# Patient Record
Sex: FEMALE | Race: ASIAN | NOT HISPANIC OR LATINO | Employment: OTHER | URBAN - METROPOLITAN AREA
[De-identification: names, ages, dates, MRNs, and addresses within clinical notes are randomized per-mention and may not be internally consistent; named-entity substitution may affect disease eponyms.]

---

## 2018-07-18 ENCOUNTER — OFFICE VISIT (OUTPATIENT)
Dept: ENDOCRINOLOGY | Facility: HOSPITAL | Age: 68
End: 2018-07-18
Payer: COMMERCIAL

## 2018-07-18 VITALS
SYSTOLIC BLOOD PRESSURE: 104 MMHG | BODY MASS INDEX: 27.32 KG/M2 | WEIGHT: 164 LBS | DIASTOLIC BLOOD PRESSURE: 62 MMHG | HEART RATE: 74 BPM | HEIGHT: 65 IN

## 2018-07-18 DIAGNOSIS — E03.8 HYPOTHYROIDISM DUE TO HASHIMOTO'S THYROIDITIS: Primary | ICD-10-CM

## 2018-07-18 DIAGNOSIS — E78.5 HYPERLIPIDEMIA, UNSPECIFIED HYPERLIPIDEMIA TYPE: ICD-10-CM

## 2018-07-18 DIAGNOSIS — Z83.3 FAMILY HISTORY OF DIABETES MELLITUS: ICD-10-CM

## 2018-07-18 DIAGNOSIS — E06.3 HYPOTHYROIDISM DUE TO HASHIMOTO'S THYROIDITIS: Primary | ICD-10-CM

## 2018-07-18 PROBLEM — E03.9 HYPOTHYROID: Status: ACTIVE | Noted: 2018-07-18

## 2018-07-18 PROCEDURE — 99203 OFFICE O/P NEW LOW 30 MIN: CPT | Performed by: INTERNAL MEDICINE

## 2018-07-18 RX ORDER — LEVOTHYROXINE SODIUM 112 MCG
112 TABLET ORAL DAILY
Qty: 90 TABLET | Refills: 3 | Status: SHIPPED | OUTPATIENT
Start: 2018-07-18 | End: 2018-08-15 | Stop reason: SDUPTHER

## 2018-07-18 RX ORDER — TAMOXIFEN CITRATE 20 MG/1
20 TABLET ORAL DAILY
COMMUNITY
Start: 2018-04-29

## 2018-07-18 RX ORDER — LEVOTHYROXINE SODIUM 112 UG/1
112 TABLET ORAL DAILY
COMMUNITY
End: 2018-07-18 | Stop reason: SDUPTHER

## 2018-07-18 RX ORDER — SIMVASTATIN 10 MG
10 TABLET ORAL
COMMUNITY
Start: 2018-05-28 | End: 2018-08-27 | Stop reason: SDUPTHER

## 2018-07-18 RX ORDER — ACETAMINOPHEN 160 MG
4000 TABLET,DISINTEGRATING ORAL DAILY
COMMUNITY

## 2018-07-18 NOTE — LETTER
July 18, 2018     Pipo Cadet DO  P O  [facility]    Patient: Mary Driscoll   YOB: 1950   Date of Visit: 7/18/2018       Dear Dr Aisha Diamond: Thank you for referring Mary Driscoll to me for evaluation  Below are my notes for this consultation  If you have questions, please do not hesitate to call me  I look forward to following your patient along with you  Sincerely,        Carlos Yu MD        CC: No Recipients  Carlos Yu MD  7/18/2018 10:06 AM  Sign at close encounter  7/18/2018    Assessment/Plan      Diagnoses and all orders for this visit:    Hypothyroidism due to Hashimoto's thyroiditis  -     T4, free Lab Collect  -     Thyroid Antibodies Panel Lab Collect  -     TSH, 3rd generation Lab Collect  -     SYNTHROID 112 MCG tablet; Take 1 tablet (112 mcg total) by mouth daily    Hyperlipidemia, unspecified hyperlipidemia type  -     Comprehensive metabolic panel Lab Collect  -     Lipid Panel with Direct LDL reflex Lab Collect    Family history of diabetes mellitus  -     Comprehensive metabolic panel Lab Collect  -     CBC and differential Lab Collect  -     HEMOGLOBIN A1C W/ EAG ESTIMATION Lab Collect  -     Lipid Panel with Direct LDL reflex Lab Collect    Other orders  -     tamoxifen (NOLVADEX) 20 mg tablet; Take 20 mg by mouth daily    -     simvastatin (ZOCOR) 10 mg tablet; Take 10 mg by mouth daily at bedtime    -     Discontinue: levothyroxine (SYNTHROID) 112 mcg tablet; Take 112 mcg by mouth daily  -     cholecalciferol (VITAMIN D3) 1,000 units tablet; Take 1,000 Units by mouth daily  -     psyllium (METAMUCIL) 58 6 % powder; Take 1 packet by mouth daily        Assessment/Plan:  1  Hypothyroidism due to Hashimoto's thyroiditis  I have no recent blood work, I have asked her to get a TSH, free T4, thyroid antibody panel done now  For now, she will continue the same brand name Synthroid 112 mcg daily  2   Hyperlipidemia    When I do her blood work for her thyroid, I will order lipid panel with CMP fasting  3   Family history of diabetes  I will be doing a fasting CMP and hemoglobin A1c for screening  I have asked her to get a hemoglobin A1c, CBC, CMP, lipid profile, TSH, free T4, and thyroid antibody panel now  She will call about a week afterwards for the results  If all is well, then I would see her in a year with preceding blood work  CC: thyroid consult    History of Present Illness     HPI: Teri Watson is a 80y o  year old female with history of hypohtyroidism due to hashimoto's thyroiditis  She was diagnosed between 5-10 years ago  She has been on thyroid hormone, brand Synthroid, ever since  She was seeing Dr Saeid Ivan until he retired  She denies heat or cold intolerance but will get some hot flashes  She denies diarrhea, palpitation, tremors, anxiety, depression, fatigue, or weight changes  She will have some sleeping irregularities in that she will wake up around 2 in the morning and have some difficulty getting back to sleep and will have to read for short while  She tends to be on the constipated side  She denies dry skin, brittle nails, or hair loss  She has no diplopia  She has no difficulties with swallowing or compressive thyroid symptoms  She is currently taking Synthroid brand 112 mcg daily  Had a history of osteopenia  Was getting DEXA scans every 2 years  Last dexa scan was normal  She was on Fosamax in the past for about 1-2 years  Review of Systems   Constitutional: Negative for fatigue and unexpected weight change  Lost 2 lbs recently on a diet  HENT: Negative for hearing loss, tinnitus and trouble swallowing  Eyes: Negative for visual disturbance  No diplopia  Wears glasses  Respiratory: Negative for chest tightness and shortness of breath  Cardiovascular: Negative for chest pain, palpitations and leg swelling  Gastrointestinal: Positive for constipation   Negative for abdominal pain, diarrhea and nausea  Occasional constipation  Some hemorrhoids  Endocrine: Negative for cold intolerance and heat intolerance  Still some hot flashes  Genitourinary:        Still on tamoxifen for up to 10 years  Musculoskeletal: Positive for back pain  Negative for arthralgias  Low back pain, chiropractor treatments  Skin: Negative for rash  No dry skin or hair loss  , but has brittle nails  Neurological: Negative for dizziness, tremors, light-headedness, numbness and headaches  Psychiatric/Behavioral: Positive for sleep disturbance  Negative for dysphoric mood  The patient is not nervous/anxious  Can have wake up at 2 am and difficulty to get back to sleep  Historical Information   Past Medical History:   Diagnosis Date    Hyperlipidemia      No past surgical history on file  Social History   History   Alcohol use Not on file     History   Drug use: Unknown     History   Smoking Status    Never Smoker   Smokeless Tobacco    Never Used     Family History:   Family History   Problem Relation Age of Onset    Diabetes unspecified Mother     Cervical cancer Mother     Depression Father        Meds/Allergies   Current Outpatient Prescriptions   Medication Sig Dispense Refill    cholecalciferol (VITAMIN D3) 1,000 units tablet Take 1,000 Units by mouth daily      levothyroxine (SYNTHROID) 112 mcg tablet Take 112 mcg by mouth daily      simvastatin (ZOCOR) 10 mg tablet       tamoxifen (NOLVADEX) 20 mg tablet        No current facility-administered medications for this visit  No Known Allergies    Objective   Vitals: Blood pressure 104/62, pulse 74, height 5' 4 5" (1 638 m), weight 74 4 kg (164 lb)  Invasive Devices          No matching active lines, drains, or airways          Physical Exam   Constitutional: She is oriented to person, place, and time  She appears well-developed and well-nourished  HENT:   Head: Normocephalic and atraumatic     Eyes: Conjunctivae and EOM are normal  Pupils are equal, round, and reactive to light  No lid lag, stare, proptosis, or periorbital edema  Neck: Normal range of motion  Neck supple  No thyromegaly present  No carotid bruits  No palpable thyroid nodules  Cardiovascular: Normal rate, regular rhythm, normal heart sounds and intact distal pulses  No murmur heard  Pulmonary/Chest: Effort normal and breath sounds normal  She has no wheezes  Abdominal: Soft  Bowel sounds are normal  There is no tenderness  Musculoskeletal: Normal range of motion  She exhibits no edema or deformity  No tremor of the outstretched hands  No spinous process tenderness  No CVAT  Lymphadenopathy:     She has no cervical adenopathy  Neurological: She is alert and oriented to person, place, and time  She has normal reflexes  Skin: Skin is warm and dry  No rash noted  Vitals reviewed  The history was obtained from the review of the chart and from the patient  Lab Results:    I have no recent bloodwork  No results found for this or any previous visit (from the past 13698 hour(s))  No future appointments

## 2018-07-18 NOTE — PROGRESS NOTES
7/18/2018    Assessment/Plan      Diagnoses and all orders for this visit:    Hypothyroidism due to Hashimoto's thyroiditis  -     T4, free Lab Collect  -     Thyroid Antibodies Panel Lab Collect  -     TSH, 3rd generation Lab Collect  -     SYNTHROID 112 MCG tablet; Take 1 tablet (112 mcg total) by mouth daily    Hyperlipidemia, unspecified hyperlipidemia type  -     Comprehensive metabolic panel Lab Collect  -     Lipid Panel with Direct LDL reflex Lab Collect    Family history of diabetes mellitus  -     Comprehensive metabolic panel Lab Collect  -     CBC and differential Lab Collect  -     HEMOGLOBIN A1C W/ EAG ESTIMATION Lab Collect  -     Lipid Panel with Direct LDL reflex Lab Collect    Other orders  -     tamoxifen (NOLVADEX) 20 mg tablet; Take 20 mg by mouth daily    -     simvastatin (ZOCOR) 10 mg tablet; Take 10 mg by mouth daily at bedtime    -     Discontinue: levothyroxine (SYNTHROID) 112 mcg tablet; Take 112 mcg by mouth daily  -     cholecalciferol (VITAMIN D3) 1,000 units tablet; Take 1,000 Units by mouth daily  -     psyllium (METAMUCIL) 58 6 % powder; Take 1 packet by mouth daily        Assessment/Plan:  1  Hypothyroidism due to Hashimoto's thyroiditis  I have no recent blood work, I have asked her to get a TSH, free T4, thyroid antibody panel done now  For now, she will continue the same brand name Synthroid 112 mcg daily  2   Hyperlipidemia  When I do her blood work for her thyroid, I will order lipid panel with CMP fasting  3   Family history of diabetes  I will be doing a fasting CMP and hemoglobin A1c for screening  I have asked her to get a hemoglobin A1c, CBC, CMP, lipid profile, TSH, free T4, and thyroid antibody panel now  She will call about a week afterwards for the results  If all is well, then I would see her in a year with preceding blood work        CC: thyroid consult    History of Present Illness     HPI: Teri Watson is a 80y o  year old female with history of hypohtyroidism due to hashimoto's thyroiditis  She was diagnosed between 5-10 years ago  She has been on thyroid hormone, brand Synthroid, ever since  She was seeing Dr Ashley Watkins until he retired  She denies heat or cold intolerance but will get some hot flashes  She denies diarrhea, palpitation, tremors, anxiety, depression, fatigue, or weight changes  She will have some sleeping irregularities in that she will wake up around 2 in the morning and have some difficulty getting back to sleep and will have to read for short while  She tends to be on the constipated side  She denies dry skin, brittle nails, or hair loss  She has no diplopia  She has no difficulties with swallowing or compressive thyroid symptoms  She is currently taking Synthroid brand 112 mcg daily  Had a history of osteopenia  Was getting DEXA scans every 2 years  Last dexa scan was normal  She was on Fosamax in the past for about 1-2 years  Review of Systems   Constitutional: Negative for fatigue and unexpected weight change  Lost 2 lbs recently on a diet  HENT: Negative for hearing loss, tinnitus and trouble swallowing  Eyes: Negative for visual disturbance  No diplopia  Wears glasses  Respiratory: Negative for chest tightness and shortness of breath  Cardiovascular: Negative for chest pain, palpitations and leg swelling  Gastrointestinal: Positive for constipation  Negative for abdominal pain, diarrhea and nausea  Occasional constipation  Some hemorrhoids  Endocrine: Negative for cold intolerance and heat intolerance  Still some hot flashes  Genitourinary:        Still on tamoxifen for up to 10 years  Musculoskeletal: Positive for back pain  Negative for arthralgias  Low back pain, chiropractor treatments  Skin: Negative for rash  No dry skin or hair loss  , but has brittle nails  Neurological: Negative for dizziness, tremors, light-headedness, numbness and headaches  Psychiatric/Behavioral: Positive for sleep disturbance  Negative for dysphoric mood  The patient is not nervous/anxious  Can have wake up at 2 am and difficulty to get back to sleep  Historical Information   Past Medical History:   Diagnosis Date    Hyperlipidemia      No past surgical history on file  Social History   History   Alcohol use Not on file     History   Drug use: Unknown     History   Smoking Status    Never Smoker   Smokeless Tobacco    Never Used     Family History:   Family History   Problem Relation Age of Onset    Diabetes unspecified Mother     Cervical cancer Mother     Depression Father        Meds/Allergies   Current Outpatient Prescriptions   Medication Sig Dispense Refill    cholecalciferol (VITAMIN D3) 1,000 units tablet Take 1,000 Units by mouth daily      levothyroxine (SYNTHROID) 112 mcg tablet Take 112 mcg by mouth daily      simvastatin (ZOCOR) 10 mg tablet       tamoxifen (NOLVADEX) 20 mg tablet        No current facility-administered medications for this visit  No Known Allergies    Objective   Vitals: Blood pressure 104/62, pulse 74, height 5' 4 5" (1 638 m), weight 74 4 kg (164 lb)  Invasive Devices          No matching active lines, drains, or airways          Physical Exam   Constitutional: She is oriented to person, place, and time  She appears well-developed and well-nourished  HENT:   Head: Normocephalic and atraumatic  Eyes: Conjunctivae and EOM are normal  Pupils are equal, round, and reactive to light  No lid lag, stare, proptosis, or periorbital edema  Neck: Normal range of motion  Neck supple  No thyromegaly present  No carotid bruits  No palpable thyroid nodules  Cardiovascular: Normal rate, regular rhythm, normal heart sounds and intact distal pulses  No murmur heard  Pulmonary/Chest: Effort normal and breath sounds normal  She has no wheezes  Abdominal: Soft  Bowel sounds are normal  There is no tenderness  Musculoskeletal: Normal range of motion  She exhibits no edema or deformity  No tremor of the outstretched hands  No spinous process tenderness  No CVAT  Lymphadenopathy:     She has no cervical adenopathy  Neurological: She is alert and oriented to person, place, and time  She has normal reflexes  Skin: Skin is warm and dry  No rash noted  Vitals reviewed  The history was obtained from the review of the chart and from the patient  Lab Results:    I have no recent bloodwork  No results found for this or any previous visit (from the past 89295 hour(s))  No future appointments

## 2018-07-18 NOTE — PATIENT INSTRUCTIONS
Let's get thyroid blood work done  Call us for results in 1 week if we don't call you  Continue the same Synthroid for now  Follow up in 1 year with blood work

## 2018-07-26 LAB — HBA1C MFR BLD HPLC: 5.6 %

## 2018-07-27 ENCOUNTER — TELEPHONE (OUTPATIENT)
Dept: ENDOCRINOLOGY | Facility: HOSPITAL | Age: 68
End: 2018-07-27

## 2018-07-27 ENCOUNTER — DOCUMENTATION (OUTPATIENT)
Dept: ENDOCRINOLOGY | Facility: HOSPITAL | Age: 68
End: 2018-07-27

## 2018-07-27 NOTE — PROGRESS NOTES
Documentation of blood work done at [facility] on 07/26/2018  Hemoglobin A1c is 5 6%  CMP showed a glucose of 92 fasting but was otherwise normal   Total cholesterol 143, triglyceride 193, HDL 42, LDL 62  CBC is normal  TSH is 1 12 with a free T4 of 1 05  Thyroid peroxidase antibodies are positive at 263  Thyroglobulin antibodies are negative less than 20

## 2018-08-15 DIAGNOSIS — E06.3 HYPOTHYROIDISM DUE TO HASHIMOTO'S THYROIDITIS: ICD-10-CM

## 2018-08-15 DIAGNOSIS — E03.8 HYPOTHYROIDISM DUE TO HASHIMOTO'S THYROIDITIS: ICD-10-CM

## 2018-08-15 RX ORDER — LEVOTHYROXINE SODIUM 112 MCG
112 TABLET ORAL DAILY
Qty: 90 TABLET | Refills: 3 | Status: SHIPPED | OUTPATIENT
Start: 2018-08-15 | End: 2019-08-01 | Stop reason: SDUPTHER

## 2018-08-27 DIAGNOSIS — E78.5 HYPERLIPIDEMIA, UNSPECIFIED HYPERLIPIDEMIA TYPE: Primary | ICD-10-CM

## 2018-08-27 RX ORDER — SIMVASTATIN 10 MG
10 TABLET ORAL
Qty: 90 TABLET | Refills: 2 | Status: SHIPPED | OUTPATIENT
Start: 2018-08-27 | End: 2019-05-28 | Stop reason: SDUPTHER

## 2019-05-28 DIAGNOSIS — E78.5 HYPERLIPIDEMIA, UNSPECIFIED HYPERLIPIDEMIA TYPE: ICD-10-CM

## 2019-05-28 RX ORDER — SIMVASTATIN 10 MG
10 TABLET ORAL
Qty: 90 TABLET | Refills: 0 | Status: SHIPPED | OUTPATIENT
Start: 2019-05-28 | End: 2019-08-01 | Stop reason: SDUPTHER

## 2019-07-11 LAB — HBA1C MFR BLD HPLC: 5.7 %

## 2019-07-24 ENCOUNTER — OFFICE VISIT (OUTPATIENT)
Dept: ENDOCRINOLOGY | Facility: HOSPITAL | Age: 69
End: 2019-07-24
Payer: COMMERCIAL

## 2019-07-24 VITALS
HEART RATE: 75 BPM | HEIGHT: 64 IN | DIASTOLIC BLOOD PRESSURE: 80 MMHG | BODY MASS INDEX: 28.41 KG/M2 | SYSTOLIC BLOOD PRESSURE: 126 MMHG | WEIGHT: 166.4 LBS

## 2019-07-24 DIAGNOSIS — E78.2 MIXED HYPERLIPIDEMIA: ICD-10-CM

## 2019-07-24 DIAGNOSIS — Z83.3 FAMILY HISTORY OF DIABETES MELLITUS: ICD-10-CM

## 2019-07-24 DIAGNOSIS — E06.3 HYPOTHYROIDISM DUE TO HASHIMOTO'S THYROIDITIS: Primary | ICD-10-CM

## 2019-07-24 DIAGNOSIS — Z79.811 AROMATASE INHIBITOR USE: ICD-10-CM

## 2019-07-24 DIAGNOSIS — M85.89 OSTEOPENIA OF MULTIPLE SITES: ICD-10-CM

## 2019-07-24 DIAGNOSIS — E03.8 HYPOTHYROIDISM DUE TO HASHIMOTO'S THYROIDITIS: Primary | ICD-10-CM

## 2019-07-24 PROCEDURE — 99213 OFFICE O/P EST LOW 20 MIN: CPT | Performed by: INTERNAL MEDICINE

## 2019-07-24 RX ORDER — DIPHENOXYLATE HYDROCHLORIDE AND ATROPINE SULFATE 2.5; .025 MG/1; MG/1
1 TABLET ORAL DAILY
COMMUNITY

## 2019-07-24 NOTE — PROGRESS NOTES
7/25/2019    Assessment/Plan      Diagnoses and all orders for this visit:    Hypothyroidism due to Hashimoto's thyroiditis  -     HEMOGLOBIN A1C W/ EAG ESTIMATION Lab Collect; Future  -     Comprehensive metabolic panel Lab Collect; Future  -     CBC and differential Lab Collect; Future  -     Lipid Panel with Direct LDL reflex Lab Collect; Future  -     T4, free Lab Collect; Future  -     TSH, 3rd generation Lab Collect; Future    Mixed hyperlipidemia  -     HEMOGLOBIN A1C W/ EAG ESTIMATION Lab Collect; Future  -     Comprehensive metabolic panel Lab Collect; Future  -     CBC and differential Lab Collect; Future  -     Lipid Panel with Direct LDL reflex Lab Collect; Future  -     T4, free Lab Collect; Future  -     TSH, 3rd generation Lab Collect; Future    Family history of diabetes mellitus  -     HEMOGLOBIN A1C W/ EAG ESTIMATION Lab Collect; Future  -     Comprehensive metabolic panel Lab Collect; Future  -     CBC and differential Lab Collect; Future  -     Lipid Panel with Direct LDL reflex Lab Collect; Future  -     T4, free Lab Collect; Future  -     TSH, 3rd generation Lab Collect; Future    Osteopenia of multiple sites  -     HEMOGLOBIN A1C W/ EAG ESTIMATION Lab Collect; Future  -     Comprehensive metabolic panel Lab Collect; Future  -     CBC and differential Lab Collect; Future  -     Lipid Panel with Direct LDL reflex Lab Collect; Future  -     T4, free Lab Collect; Future  -     TSH, 3rd generation Lab Collect; Future  -     DXA bone density spine hip and pelvis; Future    Aromatase inhibitor use  -     DXA bone density spine hip and pelvis; Future    Other orders  -     Calcium Carbonate (CALCIUM 600 PO); Take 2 capsules by mouth daily  -     multivitamin (THERAGRAN) TABS; Take 1 tablet by mouth daily        Assessment/Plan:  1  Hypothyroidism due to Hashimoto's thyroiditis  Most recent thyroid function tests are normal  She is biochemically euthyroid   I have asked her to continue the same Synthroid 112 mcg daily  2  She has a family history of diabetes  Most recent hgba1c is 5 7%  She will continue to watch her diet  3  She has a history of osteopenia and uses an aromatase inhibitor  I have asked her to get a dexa scan done as it has been over 2 years  She will continue the same vitamin D and calcium supplementation  4, She has hyperlipidemia  She is on simvastatin  I have asked her to follow up in 1 year with CBC, CMP, TSH, free T4, hgba1c, and lipid panel  CC: Hypothyroid follow up    History of Present Illness     HPI: Mariza Russell is a 77y o  year old female with history of  Hypothyroidism due to Hashimoto's thyroiditis  She was diagnosed between 5 and 10 years ago and has been on thyroid hormone brand-name Synthroid ever since  She was previously seeing Dr Didier Solitario prior to his [facility]  She is currently on brand-name Synthroid 112 mcg daily  She is always hot and sweaty  She denies cold intolerance, palpitations, tremors, diarrhea or fatigue  She has constipation  She reports sleeping is variable  She has no anxiety or depression  She has brittle nails, but no dry skin or hair loss  She has a history of osteopenia and was on Fosamax for about 1-2 years in the past   Last DEXA scan was normal  The last one was about 3 years ago  She takes vitamin-D 2000 units daily and calcium 600 mg 2 tablets daily  She is on tamoxifen now for 5 years  She has chronic low back pain at times  She has a family history of diabetes and blood sugars and hemoglobin A1c have been followed over time  She denies polyuria, polydipsia, or polyphagia  She has hyperlipidemia and takes simvastatin 10 mg daily  She denies chest pain or shortness of breath  Review of Systems   Constitutional: Negative for fatigue and unexpected weight change  HENT: Negative for trouble swallowing  Eyes: Negative for visual disturbance  No diplopia  Wears glasses     Respiratory: Negative for chest tightness and shortness of breath  Cardiovascular: Negative for chest pain and palpitations  Gastrointestinal: Positive for constipation  Negative for abdominal pain, diarrhea and nausea  Tends to be on the constipated side  Endocrine: Positive for heat intolerance  Negative for cold intolerance, polydipsia, polyphagia and polyuria  Always sweaty and hot  Musculoskeletal: Positive for back pain  Some low back pain at times  Skin: Negative for wound  No dry skin  Has brittle nails  No hair loss  Neurological: Negative for dizziness, tremors, light-headedness and headaches  Psychiatric/Behavioral: Positive for sleep disturbance  Negative for dysphoric mood  The patient is not nervous/anxious  Sleeping is variable         Historical Information   Past Medical History:   Diagnosis Date    Breast cancer ([address] )     left, had lumpectomy and XRT    Hemorrhoids     Hyperlipidemia     Osteopenia     in the past     Past Surgical History:   Procedure Laterality Date    BREAST LUMPECTOMY Left      Social History   Social History     Substance and Sexual Activity   Alcohol Use No     Social History     Substance and Sexual Activity   Drug Use No     Social History     Tobacco Use   Smoking Status Never Smoker   Smokeless Tobacco Never Used     Family History:   Family History   Problem Relation Age of Onset    Cervical cancer Mother     Diabetes type II Mother     Depression Father     No Known Problems Sister     No Known Problems Daughter        Meds/Allergies   Current Outpatient Medications   Medication Sig Dispense Refill    Calcium Carbonate (CALCIUM 600 PO) Take 2 capsules by mouth daily      cholecalciferol (VITAMIN D3) 1,000 units tablet Take 2,000 Units by mouth daily       multivitamin (THERAGRAN) TABS Take 1 tablet by mouth daily      psyllium (METAMUCIL) 58 6 % powder Take 1 packet by mouth daily prn      simvastatin (ZOCOR) 10 mg tablet Take 1 tablet (10 mg total) by mouth daily at bedtime 90 tablet 0    SYNTHROID 112 MCG tablet Take 1 tablet (112 mcg total) by mouth daily 90 tablet 3    tamoxifen (NOLVADEX) 20 mg tablet Take 20 mg by mouth daily         No current facility-administered medications for this visit  No Known Allergies    Objective   Vitals: Blood pressure 126/80, pulse 75, height 5' 4" (1 626 m), weight 75 5 kg (166 lb 6 4 oz)  Invasive Devices     None                 Physical Exam   Constitutional: She is oriented to person, place, and time  She appears well-developed and well-nourished  HENT:   Head: Normocephalic and atraumatic  Eyes: Pupils are equal, round, and reactive to light  Conjunctivae and EOM are normal    No lid lag, stare, proptosis, or periorbital edema  Neck: Normal range of motion  Neck supple  No thyromegaly present  Thyroid normal in size without palpable thyroid nodules  Cardiovascular: Normal rate, regular rhythm and normal heart sounds  No murmur heard  Pulmonary/Chest: Effort normal and breath sounds normal  She has no wheezes  Musculoskeletal: Normal range of motion  She exhibits no edema or deformity  No tremor of the outstretched hands  No spinous process tenderness  No CVA tenderness  Lymphadenopathy:     She has no cervical adenopathy  Neurological: She is alert and oriented to person, place, and time  She has normal reflexes  Deep tendon reflexes normal without briskness  Skin: Skin is warm and dry  No rash noted  Vitals reviewed  The history was obtained from the review of the chart and from the patient and   Lab Results:     Blood work done at [facility] on 07/13/2019 shows a TSH of 1 0 free with a free T4 of 1 38  Thyroid peroxidase antibodies are positive 188 signifying Hashimoto's thyroiditis  Hemoglobin A1c is 5 7%    CMP demonstrates a fasting glucose of 90 but was otherwise normal   CBC is normal     Total cholesterol 168, triglyceride 154, HDL 45, LDL 80       Future Appointments   Date Time Provider [place]   7/29/2020  9:20 AM Pb Simmons MD ENDO [facility]

## 2019-07-24 NOTE — PATIENT INSTRUCTIONS
Blood work is excellent  continue the same synthroid and simvastatin  The blood sugars Is 90 and hgba1c is 5 7%  Continue to watch diet and work on weight  Follow up in 1 year with blood work and dexa scan

## 2019-08-01 DIAGNOSIS — E78.5 HYPERLIPIDEMIA, UNSPECIFIED HYPERLIPIDEMIA TYPE: ICD-10-CM

## 2019-08-01 DIAGNOSIS — E03.8 HYPOTHYROIDISM DUE TO HASHIMOTO'S THYROIDITIS: ICD-10-CM

## 2019-08-01 DIAGNOSIS — E06.3 HYPOTHYROIDISM DUE TO HASHIMOTO'S THYROIDITIS: ICD-10-CM

## 2019-08-01 RX ORDER — LEVOTHYROXINE SODIUM 112 MCG
112 TABLET ORAL DAILY
Qty: 90 TABLET | Refills: 3 | Status: SHIPPED | OUTPATIENT
Start: 2019-08-01 | End: 2020-07-08 | Stop reason: SDUPTHER

## 2019-08-01 RX ORDER — SIMVASTATIN 10 MG
10 TABLET ORAL
Qty: 90 TABLET | Refills: 3 | Status: SHIPPED | OUTPATIENT
Start: 2019-08-01 | End: 2020-07-28 | Stop reason: SDUPTHER

## 2019-08-01 NOTE — TELEPHONE ENCOUNTER
Pt called for refill of her Simvastatin and Synthroid please  Pt saw you on 7/24 and has a follow up next July

## 2020-06-04 LAB — HBA1C MFR BLD HPLC: 5.6 %

## 2020-07-08 DIAGNOSIS — E03.8 HYPOTHYROIDISM DUE TO HASHIMOTO'S THYROIDITIS: ICD-10-CM

## 2020-07-08 DIAGNOSIS — E06.3 HYPOTHYROIDISM DUE TO HASHIMOTO'S THYROIDITIS: ICD-10-CM

## 2020-07-08 RX ORDER — LEVOTHYROXINE SODIUM 112 MCG
112 TABLET ORAL DAILY
Qty: 90 TABLET | Refills: 3 | Status: SHIPPED | OUTPATIENT
Start: 2020-07-08 | End: 2021-07-08 | Stop reason: SDUPTHER

## 2020-07-28 ENCOUNTER — OFFICE VISIT (OUTPATIENT)
Dept: ENDOCRINOLOGY | Facility: HOSPITAL | Age: 70
End: 2020-07-28
Payer: COMMERCIAL

## 2020-07-28 VITALS
HEART RATE: 68 BPM | SYSTOLIC BLOOD PRESSURE: 110 MMHG | TEMPERATURE: 97.8 F | DIASTOLIC BLOOD PRESSURE: 78 MMHG | BODY MASS INDEX: 28.44 KG/M2 | WEIGHT: 166.6 LBS | HEIGHT: 64 IN

## 2020-07-28 DIAGNOSIS — E03.8 HYPOTHYROIDISM DUE TO HASHIMOTO'S THYROIDITIS: Primary | ICD-10-CM

## 2020-07-28 DIAGNOSIS — Z79.811 AROMATASE INHIBITOR USE: ICD-10-CM

## 2020-07-28 DIAGNOSIS — E78.5 HYPERLIPIDEMIA, UNSPECIFIED HYPERLIPIDEMIA TYPE: ICD-10-CM

## 2020-07-28 DIAGNOSIS — E78.2 MIXED HYPERLIPIDEMIA: ICD-10-CM

## 2020-07-28 DIAGNOSIS — M85.89 OSTEOPENIA OF MULTIPLE SITES: ICD-10-CM

## 2020-07-28 DIAGNOSIS — Z83.3 FAMILY HISTORY OF DIABETES MELLITUS: ICD-10-CM

## 2020-07-28 DIAGNOSIS — E06.3 HYPOTHYROIDISM DUE TO HASHIMOTO'S THYROIDITIS: Primary | ICD-10-CM

## 2020-07-28 PROCEDURE — 99214 OFFICE O/P EST MOD 30 MIN: CPT | Performed by: INTERNAL MEDICINE

## 2020-07-28 RX ORDER — SIMVASTATIN 10 MG
10 TABLET ORAL
Qty: 90 TABLET | Refills: 3 | Status: SHIPPED | OUTPATIENT
Start: 2020-07-28 | End: 2022-05-02 | Stop reason: ALTCHOICE

## 2020-07-28 NOTE — PROGRESS NOTES
7/28/2020    Assessment/Plan      Diagnoses and all orders for this visit:    Hypothyroidism due to Hashimoto's thyroiditis  -     Comprehensive metabolic panel Lab Collect; Future  -     CBC and differential Lab Collect; Future  -     HEMOGLOBIN A1C W/ EAG ESTIMATION Lab Collect; Future  -     TSH, 3rd generation Lab Collect; Future  -     T4, free Lab Collect; Future  -     Lipid Panel with Direct LDL reflex Lab Collect; Future    Osteopenia of multiple sites  -     Comprehensive metabolic panel Lab Collect; Future  -     CBC and differential Lab Collect; Future  -     HEMOGLOBIN A1C W/ EAG ESTIMATION Lab Collect; Future  -     TSH, 3rd generation Lab Collect; Future  -     T4, free Lab Collect; Future  -     Lipid Panel with Direct LDL reflex Lab Collect; Future    Mixed hyperlipidemia  -     Comprehensive metabolic panel Lab Collect; Future  -     CBC and differential Lab Collect; Future  -     HEMOGLOBIN A1C W/ EAG ESTIMATION Lab Collect; Future  -     TSH, 3rd generation Lab Collect; Future  -     T4, free Lab Collect; Future  -     Lipid Panel with Direct LDL reflex Lab Collect; Future    Family history of diabetes mellitus  -     Comprehensive metabolic panel Lab Collect; Future  -     CBC and differential Lab Collect; Future  -     HEMOGLOBIN A1C W/ EAG ESTIMATION Lab Collect; Future  -     TSH, 3rd generation Lab Collect; Future  -     T4, free Lab Collect; Future  -     Lipid Panel with Direct LDL reflex Lab Collect; Future    Aromatase inhibitor use  -     Comprehensive metabolic panel Lab Collect; Future  -     CBC and differential Lab Collect; Future  -     HEMOGLOBIN A1C W/ EAG ESTIMATION Lab Collect; Future  -     TSH, 3rd generation Lab Collect; Future  -     T4, free Lab Collect; Future  -     Lipid Panel with Direct LDL reflex Lab Collect; Future    Hyperlipidemia, unspecified hyperlipidemia type  -     simvastatin (ZOCOR) 10 mg tablet;  Take 1 tablet (10 mg total) by mouth daily at bedtime        Assessment/Plan:  1  Hypothyroidism due to Hashimoto's thyroiditis  Most recent thyroid function tests are normal   She is both biochemically and clinically euthyroid  She will continue the same Synthroid brand 112 mcg daily  2  Osteopenia  She is also on tamoxifen which in some people can decrease her bone mineral density  Most recent DEXA scan does demonstrate relatively stable bone mineral density  She will continue the same vitamin-D and calcium replacement  3  Hyperlipidemia  Lipid profile is excellent  She will continue the same simvastatin 10 mg daily  4  Family history of diabetes  Most recent blood sugar is normal as is her hemoglobin A1c  She does not have diabetes at this point  I have asked her to follow up in 1 year with preceding hemoglobin A1c, CMP, CBC, TSH, free T4, and lipid panel  CC:  Hypothyroid, osteopenia, hyperlipidemia follow-up    History of Present Illness     HPI: Izzy Rodriguez is a 72y o  year old female with history of hypothyroidism due to Hashimoto's thyroiditis, osteopenia, hyperlipidemia, family history of diabetes for follow-up visit  She was diagnosed between 6 and 11 years ago with hypothyroidism due to Hashimoto's thyroiditis and has been on thyroid hormone ever since, brand-name Synthroid  She is on brand-name Synthroid 112 mcg daily  She denies heat or cold intolerance, diarrhea, tremors, palpitation, anxiety or depression  She does tend to be on the constipated side unless she does not eat her reason brand  She says sleeping is quite variable and she does often wake to urinate  She denies fatigue or weight changes  She has brittle nails but no dry skin or hair loss  She has no diplopia  She has a history of osteopenia  She was on Fosamax for 1-2 years in the past   She takes vitamin-D 4000 units daily and calcium 600 mg 2 tablets daily  She has been on tamoxifen for 6 years  Her last DEXA scan was 4 years ago    She has chronic lower back pain of sitting too long  She has hyperlipidemia and takes simvastatin 10 mg daily  She denies chest pain or shortness of breath  She has a family history of diabetes  She denies polyuria, polydipsia, polyphagia and has once a night nocturia  She denies extremity paresthesia or blurry vision  Review of Systems   Constitutional: Negative for fatigue and unexpected weight change  HENT: Negative for trouble swallowing  Eyes: Negative for visual disturbance  Weras glasses  Respiratory: Negative for chest tightness and shortness of breath  Cardiovascular: Negative for chest pain and palpitations  Gastrointestinal: Positive for constipation  Negative for abdominal pain, diarrhea and nausea  Tends to be on the constipated side and uses raisin bran daily  Endocrine: Negative for cold intolerance, heat intolerance, polydipsia, polyphagia and polyuria  Nocturia once a night  Musculoskeletal: Positive for back pain  Has lower back pain with sitting too long unchanged  Skin: Negative for rash  No dry skin  Has brittle nails, cracking down the nail  No  Hair loss  Neurological: Negative for dizziness, tremors, light-headedness, numbness and headaches  No perioral paresthesias  Psychiatric/Behavioral: Positive for sleep disturbance  Negative for dysphoric mood  The patient is not nervous/anxious  Sleeping variable, wakes to urinate         Historical Information   Past Medical History:   Diagnosis Date    Breast cancer ([address] )     left, had lumpectomy and XRT    Hemorrhoids     Hyperlipidemia     Osteopenia     in the past     Past Surgical History:   Procedure Laterality Date    BREAST LUMPECTOMY Left      Social History   Social History     Substance and Sexual Activity   Alcohol Use No     Social History     Substance and Sexual Activity   Drug Use No     Social History     Tobacco Use   Smoking Status Never Smoker   Smokeless Tobacco Never Used     Family History:   Family History   Problem Relation Age of Onset    Cervical cancer Mother     Diabetes type II Mother     Depression Father     No Known Problems Sister     No Known Problems Daughter        Meds/Allergies   Current Outpatient Medications   Medication Sig Dispense Refill    Calcium Carbonate (CALCIUM 600 PO) Take 2 capsules by mouth daily      Cholecalciferol (VITAMIN D3) 50 MCG (2000 UT) capsule Take 4,000 Units by mouth daily       multivitamin (THERAGRAN) TABS Take 1 tablet by mouth daily      simvastatin (ZOCOR) 10 mg tablet Take 1 tablet (10 mg total) by mouth daily at bedtime 90 tablet 3    SYNTHROID 112 MCG tablet Take 1 tablet (112 mcg total) by mouth daily 90 tablet 3    tamoxifen (NOLVADEX) 20 mg tablet Take 20 mg by mouth daily         No current facility-administered medications for this visit  No Known Allergies    Objective   Vitals: Blood pressure 110/78, pulse 68, temperature 97 8 °F (36 6 °C), height 5' 4" (1 626 m), weight 75 6 kg (166 lb 9 6 oz)  Invasive Devices     None                 Physical Exam   Constitutional: She is oriented to person, place, and time  She appears well-developed and well-nourished  HENT:   Head: Normocephalic and atraumatic  Eyes: Conjunctivae and EOM are normal    No lid lag, stare, proptosis, or periorbital edema  Neck: Normal range of motion  Neck supple  No thyromegaly present  Thyroid normal in size without palpable thyroid nodules  No bruits over the thyroid gland or carotids  Cardiovascular: Normal rate, regular rhythm and normal heart sounds  No murmur heard  Pulmonary/Chest: Effort normal and breath sounds normal  She has no wheezes  Abdominal: Soft  Musculoskeletal: She exhibits no edema or deformity  No tremor of the outstretched hands  Lymphadenopathy:     She has no cervical adenopathy  Neurological: She is alert and oriented to person, place, and time  She has normal reflexes  Deep tendon reflexes normal    Skin: Skin is warm and dry  No rash noted  Vitals reviewed  The history was obtained from the review of the chart and from the patient  Lab Results:    Blood work done on 06/04/2020 at [address] [place] [address] showed a CMP with a calcium of 9 1 and albumin of 4 along with a glucose of 94 fasting  Total cholesterol 155, triglyceride 161, HDL 48, LDL 75  Hemoglobin A1c is 5 6%  CBC is normal   TSH is 1 8 with a free T4 of 1 27  DEXA scan:  [facility] scan done a [facility] on 06/15/2020 shows a bone mineral density in the lumbar spine of -1 3 T-score which is not significantly changed from 2016  Bone mineral density in the left hip is -0 7 T-score which is perhaps 6% worse than in 2016       Future Appointments   Date Time Provider [facility] Tam   8/10/2021  8:00 AM Caprice Galloway MD [facility]

## 2020-07-28 NOTE — PATIENT INSTRUCTIONS
All the blood work is good  You do not have diabetes  Continue the same synthroid 112 mcg daily  Follow up in 1 year with blood work

## 2021-04-27 PROBLEM — Z90.710 HISTORY OF HYSTERECTOMY: Status: ACTIVE | Noted: 2018-10-01

## 2021-04-29 ENCOUNTER — ANNUAL EXAM (OUTPATIENT)
Dept: OBGYN CLINIC | Facility: CLINIC | Age: 71
End: 2021-04-29
Payer: COMMERCIAL

## 2021-04-29 VITALS
HEIGHT: 64 IN | DIASTOLIC BLOOD PRESSURE: 80 MMHG | BODY MASS INDEX: 27.14 KG/M2 | SYSTOLIC BLOOD PRESSURE: 120 MMHG | WEIGHT: 159 LBS

## 2021-04-29 DIAGNOSIS — Z79.811 AROMATASE INHIBITOR USE: ICD-10-CM

## 2021-04-29 DIAGNOSIS — Z85.3 PERSONAL HISTORY OF BREAST CANCER: ICD-10-CM

## 2021-04-29 DIAGNOSIS — M85.89 OSTEOPENIA OF MULTIPLE SITES: ICD-10-CM

## 2021-04-29 DIAGNOSIS — Z12.31 ENCOUNTER FOR SCREENING MAMMOGRAM FOR MALIGNANT NEOPLASM OF BREAST: ICD-10-CM

## 2021-04-29 DIAGNOSIS — Z12.31 SCREENING MAMMOGRAM FOR HIGH-RISK PATIENT: Primary | ICD-10-CM

## 2021-04-29 DIAGNOSIS — Z90.710 HISTORY OF HYSTERECTOMY: ICD-10-CM

## 2021-04-29 PROBLEM — Z79.810 USE OF TAMOXIFEN (NOLVADEX): Status: ACTIVE | Noted: 2021-04-29

## 2021-04-29 PROCEDURE — 99397 PER PM REEVAL EST PAT 65+ YR: CPT | Performed by: OBSTETRICS & GYNECOLOGY

## 2021-04-29 NOTE — LETTER
April 29, 2021     Raúl Angulo DO  P O  [facility]    Patient: Kellen Gutiérrez   YOB: 1950   Date of Visit: 4/29/2021       Dear Dr Jc Rodgers: Thank you for referring Kellen Gutiérrez to me for evaluation  Below are my notes for this consultation  If you have questions, please do not hesitate to call me  I look forward to following your patient along with you  Sincerely,        Lucio Ruiz MD        CC: No Recipients  Lucio Ruiz MD  4/29/2021  3:22 PM  Sign when Signing Visit  Assessment/Plan: All well, no complaints  Normal breast and pelvic exams  Mammo order given, dexa 2020 reviewed, repeat in 5 years  Will contact Dr Aubree Solano for colonoscopy recommendation  Use of tamoxifen (Nolvadex)  Takes Tamoxifen since breast cancer diagnosis 2013 and will complete 10 years with Dr Zafar Hercules    Personal history of breast cancer  Seven years from diagnosis, no evidence of disease, mammogram due in June  Diagnoses and all orders for this visit:    Screening mammogram for high-risk patient  -     Mammo screening bilateral w 3d & cad; Future    Encounter for screening mammogram for malignant neoplasm of breast    Personal history of breast cancer    History of hysterectomy    Osteopenia with low risk of fracture          Subjective:      Patient ID: Kellen Gutiérrez is a 72 y o  female  HPI Presents for routine exam, no complaints  Retiring! The following portions of the patient's history were reviewed and updated as appropriate: allergies, current medications, past family history, past medical history, past social history, past surgical history and problem list     Review of Systems  No breast, bladder, bowel changes   No new persistent pain, bloating, early satiety or pelvic pressure      Objective:      /80   Ht 5' 4" (1 626 m)   Wt 72 1 kg (159 lb)   BMI 27 29 kg/m²          Physical Exam  General appearance: no distress, pleasant  Neck: thyroid without nodules or thyromegaly, no palpable adenopathy  Lymph nodes: no palpable adenopathy  Breasts: no masses, nodes or skin changes   S/p left lumpectomy and XRT  Abdomen: soft, non tender, no palpable masses  Pelvic exam: normal atrophic external genitalia, urethral meatus normal, vagina atrophic without lesions, grade I-II cystocele, cuff intact, no adnexal masses, non tender  Rectal exam: normal sphincter tone, no masses, RV confirms above

## 2021-04-29 NOTE — PROGRESS NOTES
Assessment/Plan: All well, no complaints  Normal breast and pelvic exams  Mammo order given, dexa 2020 reviewed, repeat in 5 years  Will contact Dr Yessenia Swenson for colonoscopy recommendation  Use of tamoxifen (Nolvadex)  Takes Tamoxifen since breast cancer diagnosis 2013 and will complete 10 years with Dr Pelon Valladares    Personal history of breast cancer  Seven years from diagnosis, no evidence of disease, mammogram due in June  Diagnoses and all orders for this visit:    Screening mammogram for high-risk patient  -     Mammo screening bilateral w 3d & cad; Future    Encounter for screening mammogram for malignant neoplasm of breast    Personal history of breast cancer    History of hysterectomy    Osteopenia with low risk of fracture          Subjective:      Patient ID: Daniel Conrad is a 72 y o  female  HPI Presents for routine exam, no complaints  Retiring! The following portions of the patient's history were reviewed and updated as appropriate: allergies, current medications, past family history, past medical history, past social history, past surgical history and problem list     Review of Systems  No breast, bladder, bowel changes  No new persistent pain, bloating, early satiety or pelvic pressure      Objective:      /80   Ht 5' 4" (1 626 m)   Wt 72 1 kg (159 lb)   BMI 27 29 kg/m²          Physical Exam  General appearance: no distress, pleasant  Neck: thyroid without nodules or thyromegaly, no palpable adenopathy  Lymph nodes: no palpable adenopathy  Breasts: no masses, nodes or skin changes   S/p left lumpectomy and XRT  Abdomen: soft, non tender, no palpable masses  Pelvic exam: normal atrophic external genitalia, urethral meatus normal, vagina atrophic without lesions, grade I-II cystocele, cuff intact, no adnexal masses, non tender  Rectal exam: normal sphincter tone, no masses, RV confirms above

## 2021-05-10 DIAGNOSIS — E03.8 HYPOTHYROIDISM DUE TO HASHIMOTO'S THYROIDITIS: ICD-10-CM

## 2021-05-10 DIAGNOSIS — E06.3 HYPOTHYROIDISM DUE TO HASHIMOTO'S THYROIDITIS: ICD-10-CM

## 2021-05-10 NOTE — TELEPHONE ENCOUNTER
Patient has new insurance ([employer]) and uses [facility]  She said that her Synthroid will need a prior auth  BIN: [id number]  PRN: [id number]  Group: [id number]  ID#: [id number]    PA completed

## 2021-07-08 DIAGNOSIS — E03.8 HYPOTHYROIDISM DUE TO HASHIMOTO'S THYROIDITIS: Primary | ICD-10-CM

## 2021-07-08 DIAGNOSIS — E06.3 HYPOTHYROIDISM DUE TO HASHIMOTO'S THYROIDITIS: Primary | ICD-10-CM

## 2021-07-08 RX ORDER — LEVOTHYROXINE SODIUM 112 MCG
112 TABLET ORAL DAILY
Qty: 90 TABLET | Refills: 3 | Status: SHIPPED | OUTPATIENT
Start: 2021-07-08 | End: 2021-07-09 | Stop reason: SDUPTHER

## 2021-07-09 DIAGNOSIS — E06.3 HYPOTHYROIDISM DUE TO HASHIMOTO'S THYROIDITIS: ICD-10-CM

## 2021-07-09 DIAGNOSIS — E03.8 HYPOTHYROIDISM DUE TO HASHIMOTO'S THYROIDITIS: ICD-10-CM

## 2021-07-09 RX ORDER — LEVOTHYROXINE SODIUM 112 MCG
112 TABLET ORAL DAILY
Qty: 90 TABLET | Refills: 3 | Status: SHIPPED | OUTPATIENT
Start: 2021-07-09 | End: 2022-06-05

## 2021-08-10 ENCOUNTER — OFFICE VISIT (OUTPATIENT)
Dept: ENDOCRINOLOGY | Facility: HOSPITAL | Age: 71
End: 2021-08-10
Payer: COMMERCIAL

## 2021-08-10 VITALS
DIASTOLIC BLOOD PRESSURE: 78 MMHG | BODY MASS INDEX: 27.04 KG/M2 | HEIGHT: 64 IN | HEART RATE: 70 BPM | WEIGHT: 158.4 LBS | SYSTOLIC BLOOD PRESSURE: 120 MMHG

## 2021-08-10 DIAGNOSIS — E06.3 HYPOTHYROIDISM DUE TO HASHIMOTO'S THYROIDITIS: Primary | ICD-10-CM

## 2021-08-10 DIAGNOSIS — Z83.3 FAMILY HISTORY OF DIABETES MELLITUS: ICD-10-CM

## 2021-08-10 DIAGNOSIS — E03.8 HYPOTHYROIDISM DUE TO HASHIMOTO'S THYROIDITIS: Primary | ICD-10-CM

## 2021-08-10 DIAGNOSIS — E78.2 MIXED HYPERLIPIDEMIA: ICD-10-CM

## 2021-08-10 DIAGNOSIS — M85.89 OSTEOPENIA OF MULTIPLE SITES: ICD-10-CM

## 2021-08-10 DIAGNOSIS — Z79.810 USE OF TAMOXIFEN (NOLVADEX): ICD-10-CM

## 2021-08-10 PROCEDURE — 99214 OFFICE O/P EST MOD 30 MIN: CPT | Performed by: INTERNAL MEDICINE

## 2021-08-10 NOTE — PATIENT INSTRUCTIONS
The blood work is all good  Continue the same synthroid  Continue the same vitamin D and calcium  It is ok to hold on restarting the simvastatin  Follow up in 1 year with blood work and dexa scan

## 2021-08-10 NOTE — PROGRESS NOTES
8/10/2021    Assessment/Plan      Diagnoses and all orders for this visit:    Hypothyroidism due to Hashimoto's thyroiditis  -     CBC and differential Lab Collect; Future  -     Comprehensive metabolic panel Lab Collect; Future  -     Lipid Panel with Direct LDL reflex Lab Collect; Future  -     T4, free Lab Collect; Future  -     TSH, 3rd generation Lab Collect; Future  -     HEMOGLOBIN A1C W/ EAG ESTIMATION Lab Collect; Future    Osteopenia of multiple sites  -     DXA bone density spine hip and pelvis; Future  -     CBC and differential Lab Collect; Future  -     Comprehensive metabolic panel Lab Collect; Future  -     Lipid Panel with Direct LDL reflex Lab Collect; Future  -     T4, free Lab Collect; Future  -     TSH, 3rd generation Lab Collect; Future  -     HEMOGLOBIN A1C W/ EAG ESTIMATION Lab Collect; Future    Use of tamoxifen (Nolvadex)  -     DXA bone density spine hip and pelvis; Future  -     CBC and differential Lab Collect; Future  -     Comprehensive metabolic panel Lab Collect; Future  -     Lipid Panel with Direct LDL reflex Lab Collect; Future  -     T4, free Lab Collect; Future  -     TSH, 3rd generation Lab Collect; Future  -     HEMOGLOBIN A1C W/ EAG ESTIMATION Lab Collect; Future    Mixed hyperlipidemia  -     CBC and differential Lab Collect; Future  -     Comprehensive metabolic panel Lab Collect; Future  -     Lipid Panel with Direct LDL reflex Lab Collect; Future  -     T4, free Lab Collect; Future  -     TSH, 3rd generation Lab Collect; Future  -     HEMOGLOBIN A1C W/ EAG ESTIMATION Lab Collect; Future    Family history of diabetes mellitus  -     CBC and differential Lab Collect; Future  -     Comprehensive metabolic panel Lab Collect; Future  -     Lipid Panel with Direct LDL reflex Lab Collect; Future  -     T4, free Lab Collect; Future  -     TSH, 3rd generation Lab Collect; Future  -     HEMOGLOBIN A1C W/ EAG ESTIMATION Lab Collect; Future        Assessment/Plan:    1  Hypothyroidism due to Hashimoto's thyroiditis  Most recent thyroid function tests are normal   She is biochemically and clinically euthyroid  She will continue the same Synthroid brand 112 mcg daily  2  Osteopenia  She is also utilizing tamoxifen which increases her risk of osteoporosis  She will continue the same calcium carbonate and vitamin-D replacement  She is due for repeat DEXA scan next June 2022  3  Hyperlipidemia  Most recent lipid profile is quite good so it is not unreasonable to hold on restarting simvastatin for now  4  Family history of type 2 diabetes  Hemoglobin A1c is 5 3% which is normal   She has not had the onset of type 2 diabetes at this point  She will continue to work on carbohydrate restriction and portion control  I have asked her to follow up in 1 year with preceding TSH, free T4, hemoglobin A1c, CMP, CBC, lipid panel, and DEXA scan  CC:   Hypothyroid, osteopenia, hyperlipidemia, family history diabetes follow-up    History of Present Illness     HPI: Annika Huston is a 80y o  year old female with history of  hypothyroidism due to Hashimoto's thyroiditis, osteopenia, hyperlipidemia, family history of diabetes for follow-up visit  She was diagnosed between 7 and 12 years ago with hypothyroidism due to Hashimoto's thyroiditis and has been on thyroid hormone for replacement purposes, brand-name Synthroid ever since  She currently takes brand-name Synthroid 112 mcg daily  She has brittle nails but no dry skin or hair loss  Weight is 8 lb less than last year  She is somewhat fatigued  She has constipation at times  She still has some hot flashes on tamoxifen  She will wake at night to urinate and sometimes take a while to get back to sleep  She denies diarrhea, palpitation, tremors, cold intolerance, anxiety, or depression  She denies diplopia  She has a history of osteopenia    She was on Fosamax for 1-2 years in the past   She takes vitamin-D 4000 units daily and calcium 600 mg 2 tablets daily  Of note, she has been on tamoxifen for 6 years  She has hyperlipidemia and used to take simvastatin 10 mg daily  Stopped recently by PCP due to elevated liver function tests  Recent liver tests off simvastatin still elevated  To have further testing per PCP  She denies chest pain or shortness of breath  She has a family history of type 2 diabetes  She denies polyuria, polydipsia, or polyphagia  She has once a night nocturia  She denies extremity paresthesia or blurry vision  Review of Systems   Constitutional: Positive for fatigue  Negative for unexpected weight change  Weight 8 lb less than last year  HENT: Negative for trouble swallowing  Eyes: Negative for visual disturbance  Wears glasses  Respiratory: Negative for chest tightness and shortness of breath  Cardiovascular: Negative for chest pain and palpitations  Gastrointestinal: Positive for constipation  Negative for abdominal pain, diarrhea and nausea  Constipation at times  Endocrine: Negative for cold intolerance, heat intolerance, polydipsia, polyphagia and polyuria  Still some hot flashes on tamoxifen  Nocturia once a night  Musculoskeletal: Positive for back pain  Occasional low back pain  Skin: Negative for rash  No dry skin  Has brittle and splitting nails  No hair loss  Neurological: Negative for dizziness, tremors, weakness, light-headedness, numbness and headaches  Psychiatric/Behavioral: Positive for sleep disturbance  Negative for dysphoric mood  The patient is not nervous/anxious  Retired in may 2021  will wake at night to urinate and takes a little time to get back to sleep         Historical Information   Past Medical History:   Diagnosis Date    Breast [address] [address] )     left, had lumpectomy and XRT    H/O bone density study 06/15/2020    Osteopenia    H/O mammogram 06/16/2020    BIRADS 1C    Hemorrhoids  Hyperlipidemia     Hypothyroidism     Osteopenia     in the past    Osteoporosis screening 06/15/2020    Managed by endocrine, off Fosamax 2014, dexa due 2021     Past Surgical History:   Procedure Laterality Date    BREAST LUMPECTOMY Left     COLONOSCOPY  2015    Due 2020    HYSTERECTOMY      HYSTEROSCOPY W/ POLYPECTOMY  10/2015     Social History   Social History     Substance and Sexual Activity   Alcohol Use No     Social History     Substance and Sexual Activity   Drug Use No     Social History     Tobacco Use   Smoking Status Never Smoker   Smokeless Tobacco Never Used     Family History:   Family History   Problem Relation Age of Onset    Cervical cancer Mother     Diabetes type II Mother     Depression Father     No Known Problems Sister     No Known Problems Daughter     Breast cancer Neg Hx     Uterine cancer Neg Hx     Ovarian cancer Neg Hx     Colon cancer Neg Hx        Meds/Allergies   Current Outpatient Medications   Medication Sig Dispense Refill    Calcium Carbonate (CALCIUM 600 PO) Take 2 capsules by mouth daily      Cholecalciferol (VITAMIN D3) 50 MCG (2000 UT) capsule Take 4,000 Units by mouth daily       multivitamin (THERAGRAN) TABS Take 1 tablet by mouth daily      Synthroid 112 MCG tablet Take 1 tablet (112 mcg total) by mouth daily 90 tablet 3    tamoxifen (NOLVADEX) 20 mg tablet Take 20 mg by mouth daily        simvastatin (ZOCOR) 10 mg tablet Take 1 tablet (10 mg total) by mouth daily at bedtime (Patient not taking: Reported on 8/10/2021) 90 tablet 3     No current facility-administered medications for this visit  No Known Allergies    Objective   Vitals: Blood pressure 120/78, pulse 70, height 5' 4" (1 626 m), weight 71 8 kg (158 lb 6 4 oz)  Invasive Devices     None                 Physical Exam  Vitals reviewed  Constitutional:       Appearance: Normal appearance  She is well-developed  HENT:      Head: Normocephalic and atraumatic     Eyes: Extraocular Movements: Extraocular movements intact  Conjunctiva/sclera: Conjunctivae normal       Comments: No lid lag, stare, proptosis, or periorbital edema  Neck:      Thyroid: No thyromegaly  Vascular: No carotid bruit  Comments: Thyroid normal in size  No palpable thyroid nodules  No bruits over the thyroid gland  Cardiovascular:      Rate and Rhythm: Normal rate and regular rhythm  Heart sounds: Normal heart sounds  No murmur heard  Pulmonary:      Effort: Pulmonary effort is normal       Breath sounds: Normal breath sounds  No wheezing  Abdominal:      Palpations: Abdomen is soft  Musculoskeletal:         General: No deformity  Normal range of motion  Cervical back: Normal range of motion and neck supple  Right lower leg: No edema  Left lower leg: No edema  Comments: No tremor of the outstretched hands  No CVA tenderness  No spinous process tenderness  Lymphadenopathy:      Cervical: No cervical adenopathy  Skin:     General: Skin is warm and dry  Findings: No rash  Neurological:      Mental Status: She is alert and oriented to person, place, and time  Deep Tendon Reflexes: Reflexes are normal and symmetric  Comments: Deep tendon reflexes normal          The history was obtained from the review of the chart and from the patient  Lab Results:     Blood work done on 06/24/2021  Performed at [facility] showed hemoglobin A1c of 5 3%  CMP showed a fasting glucose of 91, AST of 42 and an ALT of 61 but was otherwise normal     Total cholesterol 192, triglyceride 205, HDL 47,   TSH is 0 291 with a free T4 of 1 64      CBC is normal     Future Appointments   Date Time Provider Guanaco Tam   8/15/2022  9:20 AM Carlos Yu MD [facility]

## 2021-08-19 ENCOUNTER — VBI (OUTPATIENT)
Dept: ADMINISTRATIVE | Facility: OTHER | Age: 71
End: 2021-08-19

## 2021-08-19 NOTE — TELEPHONE ENCOUNTER
Upon review of the In Basket request we were able to locate, review, and update the patient chart as requested for Mammogram and Pap Smear (HPV) aka Cervical Cancer Screening  Any additional questions or concerns should be emailed to the Practice Liaisons via [email or web address] email, please do not reply via In Basket      Thank you  Brandon Christy

## 2021-11-08 ENCOUNTER — TELEPHONE (OUTPATIENT)
Dept: OBGYN CLINIC | Facility: CLINIC | Age: 71
End: 2021-11-08

## 2022-03-09 DIAGNOSIS — E78.2 MIXED HYPERLIPIDEMIA: Primary | ICD-10-CM

## 2022-03-09 RX ORDER — PRAVASTATIN SODIUM 40 MG
40 TABLET ORAL DAILY
Qty: 90 TABLET | Refills: 3 | Status: SHIPPED | OUTPATIENT
Start: 2022-03-09

## 2022-04-29 NOTE — PROGRESS NOTES
Assessment/Plan:    Encounter for gynecological examination (general) (routine) without abnormal findings  Still with DEMETRA, no urge  Otherwise no complaints  Normal breast and pelvic exams  Mammo order given  Referral given for pelvic floor PT if interested, discussed option of urogyn for TVT if ineffective  Dexa followed with endocrine, order has been placed    Personal history of breast cancer  8 years from diagnosis, MIKE       Diagnoses and all orders for this visit:    Encounter for gynecological examination (general) (routine) without abnormal findings    Encounter for screening mammogram for malignant neoplasm of breast  -     Mammo screening bilateral w 3d & cad; Future    Personal history of breast cancer  -     Mammo screening bilateral w 3d & cad; Future    DEMETRA (stress urinary incontinence, female)  -     Ambulatory Referral to Physical Therapy; Future    History of hysterectomy    Other orders  -     meloxicam (MOBIC) 7 5 mg tablet; Take 7 5 mg by mouth as needed          Subjective:      Patient ID: Jae Cooper is a 71 y o  female  Here for gyn annual follow up    The following portions of the patient's history were reviewed and updated as appropriate:   She  has a past medical history of Breast cancer ([facility] [address] ), H/O bone density study (06/15/2020), H/O mammogram (08/05/2021), Hemorrhoids, Hyperlipidemia, Hypothyroidism, Osteopenia, and Osteoporosis screening (06/15/2020)    She   Patient Active Problem List    Diagnosis Date Noted    DEMETRA (stress urinary incontinence, female) 05/02/2022    Encounter for gynecological examination (general) (routine) without abnormal findings 05/02/2022    Use of tamoxifen (Nolvadex) 04/29/2021    Osteopenia of multiple sites 07/24/2019    History of hysterectomy 10/2018    Hypothyroidism due to Hashimoto's thyroiditis 07/18/2018    Hyperlipidemia 07/18/2018    Family history of diabetes mellitus 07/18/2018    Personal history of breast cancer 2013 She  has a past surgical history that includes Breast lumpectomy (Left); Hysterectomy; Hysteroscopy w/ polypectomy (10/2015); and Colonoscopy (2015)  Her family history includes Cervical cancer in her mother; Depression in her father; Diabetes type II in her mother; No Known Problems in her daughter and sister  She  reports that she has never smoked  She has never used smokeless tobacco  She reports that she does not drink alcohol and does not use drugs  Current Outpatient Medications   Medication Sig Dispense Refill    Calcium Carbonate (CALCIUM 600 PO) Take 2 capsules by mouth daily      Cholecalciferol (VITAMIN D3) 50 MCG (2000 UT) capsule Take 4,000 Units by mouth daily       meloxicam (MOBIC) 7 5 mg tablet Take 7 5 mg by mouth as needed      multivitamin (THERAGRAN) TABS Take 1 tablet by mouth daily      pravastatin (PRAVACHOL) 40 mg tablet Take 1 tablet (40 mg total) by mouth daily 90 tablet 3    Synthroid 112 MCG tablet Take 1 tablet (112 mcg total) by mouth daily 90 tablet 3    tamoxifen (NOLVADEX) 20 mg tablet Take 20 mg by mouth daily         No current facility-administered medications for this visit  She has No Known Allergies       Review of Systems  No breast, bladder, bowel changes  No new persistent pain, bloating, early satiety or pelvic pressure      Objective:      /60   Ht 5' 4" (1 626 m)   Wt 71 4 kg (157 lb 6 4 oz)   Breastfeeding No   BMI 27 02 kg/m²          Physical Exam    General appearance: no distress, pleasant  Neck: thyroid without nodules or thyromegaly, no palpable adenopathy  Lymph nodes: no palpable adenopathy  Breasts: no masses, nodes or skin changes  S/p left lumpectomy and XRT  Abdomen: soft, non tender, no palpable masses  Pelvic exam: normal atrophic external genitalia, urethral meatus normal, vagina atrophic without lesions, grade I-II cystocele, cuff intact, no adnexal masses, non tender   DEMETRA elicited with cough  Rectal exam: normal sphincter tone, no masses, RV confirms above

## 2022-05-02 ENCOUNTER — OFFICE VISIT (OUTPATIENT)
Dept: OBGYN CLINIC | Facility: CLINIC | Age: 72
End: 2022-05-02
Payer: COMMERCIAL

## 2022-05-02 VITALS
DIASTOLIC BLOOD PRESSURE: 60 MMHG | HEIGHT: 64 IN | SYSTOLIC BLOOD PRESSURE: 108 MMHG | WEIGHT: 157.4 LBS | BODY MASS INDEX: 26.87 KG/M2

## 2022-05-02 DIAGNOSIS — Z01.419 ENCOUNTER FOR GYNECOLOGICAL EXAMINATION (GENERAL) (ROUTINE) WITHOUT ABNORMAL FINDINGS: Primary | ICD-10-CM

## 2022-05-02 DIAGNOSIS — Z85.3 PERSONAL HISTORY OF BREAST CANCER: ICD-10-CM

## 2022-05-02 DIAGNOSIS — N39.3 SUI (STRESS URINARY INCONTINENCE, FEMALE): ICD-10-CM

## 2022-05-02 DIAGNOSIS — Z12.31 ENCOUNTER FOR SCREENING MAMMOGRAM FOR MALIGNANT NEOPLASM OF BREAST: ICD-10-CM

## 2022-05-02 DIAGNOSIS — Z90.710 HISTORY OF HYSTERECTOMY: ICD-10-CM

## 2022-05-02 PROCEDURE — G0101 CA SCREEN;PELVIC/BREAST EXAM: HCPCS | Performed by: OBSTETRICS & GYNECOLOGY

## 2022-05-02 RX ORDER — MELOXICAM 7.5 MG/1
7.5 TABLET ORAL AS NEEDED
COMMUNITY
Start: 2022-02-01

## 2022-05-02 NOTE — ASSESSMENT & PLAN NOTE
Still with DEMETRA, no urge  Otherwise no complaints  Normal breast and pelvic exams  Mammo order given  Referral given for pelvic floor PT if interested, discussed option of urogyn for TVT if ineffective     Dexa followed with endocrine, order has been placed

## 2022-05-02 NOTE — LETTER
May 2, 2022     [place], [address] [facility] [address] [address]    Patient: Anju Carrier   YOB: 1950   Date of Visit: 5/2/2022       Dear Dr Bryant Galloway: Thank you for referring Bhupinder Hope to me for evaluation  Below are my notes for this consultation  If you have questions, please do not hesitate to call me  I look forward to following your patient along with you  Sincerely,        Enoc Stanley MD        CC: No Recipients  Enoc Stanley MD  5/2/2022  9:33 AM  Sign when Signing Visit  Assessment/Plan:    Encounter for gynecological examination (general) (routine) without abnormal findings  Still with DEMETRA, no urge  Otherwise no complaints  Normal breast and pelvic exams  Mammo order given  Referral given for pelvic floor PT if interested, discussed option of urogyn for TVT if ineffective  Dexa followed with endocrine, order has been placed    Personal history of breast cancer  8 years from diagnosis, MIKE       Diagnoses and all orders for this visit:    Encounter for gynecological examination (general) (routine) without abnormal findings    Encounter for screening mammogram for malignant neoplasm of breast  -     Mammo screening bilateral w 3d & cad; Future    Personal history of breast cancer  -     Mammo screening bilateral w 3d & cad; Future    DEMETRA (stress urinary incontinence, female)  -     Ambulatory Referral to Physical Therapy; Future    History of hysterectomy    Other orders  -     meloxicam (MOBIC) 7 5 mg tablet; Take 7 5 mg by mouth as needed          Subjective:      Patient ID: Anju Carrier is a 71 y o  female      Here for gyn annual follow up    The following portions of the patient's history were reviewed and updated as appropriate:   She  has a past medical history of Breast cancer ([address] ), H/O bone density study (06/15/2020), H/O mammogram (08/05/2021), Hemorrhoids, Hyperlipidemia, Hypothyroidism, Osteopenia, and Osteoporosis screening (06/15/2020)  She   Patient Active Problem List    Diagnosis Date Noted    DEMETRA (stress urinary incontinence, female) 05/02/2022    Encounter for gynecological examination (general) (routine) without abnormal findings 05/02/2022    Use of tamoxifen (Nolvadex) 04/29/2021    Osteopenia of multiple sites 07/24/2019    History of hysterectomy 10/2018    Hypothyroidism due to Hashimoto's thyroiditis 07/18/2018    Hyperlipidemia 07/18/2018    Family history of diabetes mellitus 07/18/2018    Personal history of breast cancer 2013     She  has a past surgical history that includes Breast lumpectomy (Left); Hysterectomy; Hysteroscopy w/ polypectomy (10/2015); and Colonoscopy (2015)  Her family history includes Cervical cancer in her mother; Depression in her father; Diabetes type II in her mother; No Known Problems in her daughter and sister  She  reports that she has never smoked  She has never used smokeless tobacco  She reports that she does not drink alcohol and does not use drugs  Current Outpatient Medications   Medication Sig Dispense Refill    Calcium Carbonate (CALCIUM 600 PO) Take 2 capsules by mouth daily      Cholecalciferol (VITAMIN D3) 50 MCG (2000 UT) capsule Take 4,000 Units by mouth daily       meloxicam (MOBIC) 7 5 mg tablet Take 7 5 mg by mouth as needed      multivitamin (THERAGRAN) TABS Take 1 tablet by mouth daily      pravastatin (PRAVACHOL) 40 mg tablet Take 1 tablet (40 mg total) by mouth daily 90 tablet 3    Synthroid 112 MCG tablet Take 1 tablet (112 mcg total) by mouth daily 90 tablet 3    tamoxifen (NOLVADEX) 20 mg tablet Take 20 mg by mouth daily         No current facility-administered medications for this visit  She has No Known Allergies       Review of Systems  No breast, bladder, bowel changes   No new persistent pain, bloating, early satiety or pelvic pressure      Objective:      /60   Ht 5' 4" (1 626 m)   Wt 71 4 kg (157 lb 6 4 oz)   Breastfeeding No   BMI 27 02 kg/m²          Physical Exam    General appearance: no distress, pleasant  Neck: thyroid without nodules or thyromegaly, no palpable adenopathy  Lymph nodes: no palpable adenopathy  Breasts: no masses, nodes or skin changes  S/p left lumpectomy and XRT  Abdomen: soft, non tender, no palpable masses  Pelvic exam: normal atrophic external genitalia, urethral meatus normal, vagina atrophic without lesions, grade I-II cystocele, cuff intact, no adnexal masses, non tender   DEMETRA elicited with cough  Rectal exam: normal sphincter tone, no masses, RV confirms above

## 2022-06-04 DIAGNOSIS — E06.3 HYPOTHYROIDISM DUE TO HASHIMOTO'S THYROIDITIS: ICD-10-CM

## 2022-06-04 DIAGNOSIS — E03.8 HYPOTHYROIDISM DUE TO HASHIMOTO'S THYROIDITIS: ICD-10-CM

## 2022-06-05 RX ORDER — LEVOTHYROXINE SODIUM 112 MCG
TABLET ORAL
Qty: 90 TABLET | Refills: 3 | Status: SHIPPED | OUTPATIENT
Start: 2022-06-05

## 2022-07-08 LAB
ALBUMIN SERPL-MCNC: 3.8 G/DL (ref 3.7–4.7)
ALBUMIN/GLOB SERPL: 1.5 {RATIO} (ref 1.2–2.2)
ALP SERPL-CCNC: 56 IU/L (ref 44–121)
ALT SERPL-CCNC: 46 IU/L (ref 0–32)
AST SERPL-CCNC: 36 IU/L (ref 0–40)
BASOPHILS # BLD AUTO: 0 X10E3/UL (ref 0–0.2)
BASOPHILS NFR BLD AUTO: 1 %
BILIRUB SERPL-MCNC: 0.5 MG/DL (ref 0–1.2)
BUN SERPL-MCNC: 10 MG/DL (ref 8–27)
BUN/CREAT SERPL: 17 (ref 12–28)
CALCIUM SERPL-MCNC: 9 MG/DL (ref 8.7–10.3)
CHLORIDE SERPL-SCNC: 102 MMOL/L (ref 96–106)
CHOLEST SERPL-MCNC: 147 MG/DL (ref 100–199)
CO2 SERPL-SCNC: 24 MMOL/L (ref 20–29)
CREAT SERPL-MCNC: 0.58 MG/DL (ref 0.57–1)
EGFR: 97 ML/MIN/1.73
EOSINOPHIL # BLD AUTO: 0.2 X10E3/UL (ref 0–0.4)
EOSINOPHIL NFR BLD AUTO: 3 %
ERYTHROCYTE [DISTWIDTH] IN BLOOD BY AUTOMATED COUNT: 12.6 % (ref 11.7–15.4)
EST. AVERAGE GLUCOSE BLD GHB EST-MCNC: 114 MG/DL
GLOBULIN SER-MCNC: 2.6 G/DL (ref 1.5–4.5)
GLUCOSE SERPL-MCNC: 86 MG/DL (ref 65–99)
HBA1C MFR BLD: 5.6 % (ref 4.8–5.6)
HCT VFR BLD AUTO: 40.1 % (ref 34–46.6)
HDLC SERPL-MCNC: 52 MG/DL
HGB BLD-MCNC: 13.5 G/DL (ref 11.1–15.9)
IMM GRANULOCYTES # BLD: 0 X10E3/UL (ref 0–0.1)
IMM GRANULOCYTES NFR BLD: 0 %
LDLC SERPL CALC-MCNC: 73 MG/DL (ref 0–99)
LDLC/HDLC SERPL: 1.4 RATIO (ref 0–3.2)
LYMPHOCYTES # BLD AUTO: 2.6 X10E3/UL (ref 0.7–3.1)
LYMPHOCYTES NFR BLD AUTO: 32 %
MCH RBC QN AUTO: 30.9 PG (ref 26.6–33)
MCHC RBC AUTO-ENTMCNC: 33.7 G/DL (ref 31.5–35.7)
MCV RBC AUTO: 92 FL (ref 79–97)
MONOCYTES # BLD AUTO: 0.8 X10E3/UL (ref 0.1–0.9)
MONOCYTES NFR BLD AUTO: 10 %
NEUTROPHILS # BLD AUTO: 4.4 X10E3/UL (ref 1.4–7)
NEUTROPHILS NFR BLD AUTO: 54 %
PLATELET # BLD AUTO: 209 X10E3/UL (ref 150–450)
POTASSIUM SERPL-SCNC: 4.4 MMOL/L (ref 3.5–5.2)
PROT SERPL-MCNC: 6.4 G/DL (ref 6–8.5)
RBC # BLD AUTO: 4.37 X10E6/UL (ref 3.77–5.28)
SL AMB VLDL CHOLESTEROL CALC: 22 MG/DL (ref 5–40)
SODIUM SERPL-SCNC: 139 MMOL/L (ref 134–144)
T4 FREE SERPL-MCNC: 1.51 NG/DL (ref 0.82–1.77)
TRIGL SERPL-MCNC: 123 MG/DL (ref 0–149)
TSH SERPL DL<=0.005 MIU/L-ACNC: 0.04 UIU/ML (ref 0.45–4.5)
WBC # BLD AUTO: 8 X10E3/UL (ref 3.4–10.8)

## 2022-08-15 ENCOUNTER — OFFICE VISIT (OUTPATIENT)
Dept: ENDOCRINOLOGY | Facility: HOSPITAL | Age: 72
End: 2022-08-15
Payer: COMMERCIAL

## 2022-08-15 VITALS
HEART RATE: 68 BPM | BODY MASS INDEX: 27.18 KG/M2 | SYSTOLIC BLOOD PRESSURE: 120 MMHG | DIASTOLIC BLOOD PRESSURE: 78 MMHG | WEIGHT: 159.2 LBS | HEIGHT: 64 IN

## 2022-08-15 DIAGNOSIS — E06.3 HYPOTHYROIDISM DUE TO HASHIMOTO'S THYROIDITIS: Primary | ICD-10-CM

## 2022-08-15 DIAGNOSIS — M85.89 OSTEOPENIA OF MULTIPLE SITES: ICD-10-CM

## 2022-08-15 DIAGNOSIS — E03.8 HYPOTHYROIDISM DUE TO HASHIMOTO'S THYROIDITIS: Primary | ICD-10-CM

## 2022-08-15 DIAGNOSIS — E78.2 MIXED HYPERLIPIDEMIA: ICD-10-CM

## 2022-08-15 DIAGNOSIS — Z83.3 FAMILY HISTORY OF DIABETES MELLITUS: ICD-10-CM

## 2022-08-15 PROCEDURE — 99214 OFFICE O/P EST MOD 30 MIN: CPT | Performed by: INTERNAL MEDICINE

## 2022-08-15 RX ORDER — LEVOTHYROXINE SODIUM 100 MCG
100 TABLET ORAL DAILY
Qty: 90 TABLET | Refills: 3
Start: 2022-08-15 | End: 2022-09-06 | Stop reason: SDUPTHER

## 2022-08-15 NOTE — PATIENT INSTRUCTIONS
The thyroid blood work is overactive  Let's decrease the synthroid to 100 mcg daily  To use up the synthroid 112 mcg 1 tablet 6 days a week and none 1 day a week  We'll recheck the blood work in 6 months  Follow up in 1 year with blood work  You are due for dexa scan

## 2022-08-15 NOTE — PROGRESS NOTES
8/15/2022    Assessment/Plan      Diagnoses and all orders for this visit:    Hypothyroidism due to Hashimoto's thyroiditis  -     Synthroid 100 MCG tablet; Take 1 tablet (100 mcg total) by mouth daily  -     TSH, 3rd generation Lab Collect; Future  -     T4, free Lab Collect; Future  -     Comprehensive metabolic panel Lab Collect; Future  -     CBC and differential Lab Collect; Future  -     TSH, 3rd generation Lab Collect; Future  -     T4, free Lab Collect; Future    Osteopenia of multiple sites  -     Comprehensive metabolic panel Lab Collect; Future  -     CBC and differential Lab Collect; Future  -     TSH, 3rd generation Lab Collect; Future  -     T4, free Lab Collect; Future  -     DXA bone density spine hip and pelvis; Future    Family history of diabetes mellitus  -     Comprehensive metabolic panel Lab Collect; Future  -     CBC and differential Lab Collect; Future  -     TSH, 3rd generation Lab Collect; Future  -     T4, free Lab Collect; Future    Mixed hyperlipidemia  -     Comprehensive metabolic panel Lab Collect; Future  -     CBC and differential Lab Collect; Future  -     TSH, 3rd generation Lab Collect; Future  -     T4, free Lab Collect; Future        Assessment/Plan:  1  Hypothyroidism due to Hashimoto's thyroiditis  Most recent thyroid function tests do show a low TSH with a normal free T4 signifying biochemical hyperthyroidism  I will have her decrease her Synthroid to 100 mcg daily but she can take Synthroid 112 mcg 1 tablet 6 days a week and none 1 day a week  I will then repeat her blood work in 6 months  2  Osteopenia of multiple sites  She is due for a DEXA scan now  She will continue the same calcium and vitamin-D replacement  3  Hyperlipidemia  She is following with her primary care physician regarding this problem  Most recent lipid profile is excellent  4  Family history of diabetes  Hemoglobin A1c and CMP demonstrates normalcy        I have asked her to follow up in 1 year with preceding CMP, CBC, TSH, and free T4  I have asked her to repeat blood work in 6 months consisting of a TSH and free T4       CC:  Hypothyroid, osteopenia, hyperlipidemia, family history diabetes follow-up    History of Present Illness     HPI: Raymond Foley is a 71y o  year old female with history of Hypothyroidism due to Hashimoto's thyroiditis, osteopenia, hyperlipidemia, family history of diabetes for follow up visit  She was diagnosed between 8-13 years ago with hypothyroidism due to Hashimoto's thyroiditis and has been on thyroid hormone for replacement purposes, brand-name Synthroid ever since  She is currently on brand-name Synthroid 112 mcg daily  She has occasional constipation  She is difficulty falling asleep and then wakes at 3:00 a m  for about an hour  She has brittle nails that crack but no dry skin or hair loss  She denies heat or cold intolerance, palpitation, tremors, fatigue, diarrhea, anxiety or depression  She denies weight changes  She denies diplopia  She denies compressive thyroid symptoms or difficulties with swallowing  She is a history of osteopenia  She is currently utilizing tamoxifen  She is currently on calcium carbonate 600 mg 2 capsules daily and vitamin-D replacement with 4000 units daily  She denies back pain  She has hyperlipidemia and is currently on pravastatin 40 mg daily as simvastatin was discontinued due to elevated liver function tests by her PCP  This is treated by PCP  She denies chest pain or shortness of breath  She is a family history of type 2 diabetes  She has no polyuria, polydipsia, or polyphagia but has once a night nocturia  She is no extremity paresthesia or blurry vision  Review of Systems   Constitutional: Negative for fatigue and unexpected weight change  HENT: Negative for trouble swallowing  Eyes: Negative for visual disturbance  Wears glasses  No diplopia      Respiratory: Negative for chest tightness and shortness of breath  Cardiovascular: Negative for chest pain and palpitations  Gastrointestinal: Positive for constipation  Negative for abdominal pain, diarrhea and nausea  Occasional constipation  Endocrine: Negative for cold intolerance, heat intolerance, polydipsia, polyphagia and polyuria  Some hot flashes with flushing of the face on tamoxifen  Nocturia once a night  Musculoskeletal: Positive for arthralgias  Negative for back pain  Bilateral knee pain, right greater than the left  Skin: Negative for rash  No dry skin  Has brittle nails and cracking nails lengthwise at times  No hair loss  Neurological: Negative for dizziness, tremors, weakness, light-headedness, numbness and headaches  Psychiatric/Behavioral: Positive for sleep disturbance  Negative for confusion and dysphoric mood  The patient is not nervous/anxious  Difficulty falling asleep and then wake at 3 am for 1 hour          Historical Information   Past Medical History:   Diagnosis Date    Breast cancer ([address] )     left, had lumpectomy and XRT,stage 0    H/O bone density study 06/15/2020    Osteopenia    H/O mammogram 08/05/2021    BIRADS 2B    Hemorrhoids     Hyperlipidemia     Hypothyroidism     Osteopenia     in the past    Osteoporosis screening 06/15/2020    Managed by endocrine, off Fosamax 2014, dexa due 2021     Past Surgical History:   Procedure Laterality Date    BREAST LUMPECTOMY Left     COLONOSCOPY  2015 Due 2020    HYSTERECTOMY      HYSTEROSCOPY W/ POLYPECTOMY  10/2015     Social History   Social History     Substance and Sexual Activity   Alcohol Use No     Social History     Substance and Sexual Activity   Drug Use No     Social History     Tobacco Use   Smoking Status Never Smoker   Smokeless Tobacco Never Used     Family History:   Family History   Problem Relation Age of Onset    Cervical cancer Mother     Diabetes type II Mother    [facility] Depression Father     No Known Problems Sister     No Known Problems Daughter     Breast cancer Neg Hx     Uterine cancer Neg Hx     Ovarian cancer Neg Hx     Colon cancer Neg Hx        Meds/Allergies   Current Outpatient Medications   Medication Sig Dispense Refill    Calcium Carbonate (CALCIUM 600 PO) Take 2 capsules by mouth daily      Cholecalciferol (VITAMIN D3) 50 MCG (2000 UT) capsule Take 4,000 Units by mouth daily       meloxicam (MOBIC) 7 5 mg tablet Take 7 5 mg by mouth as needed      multivitamin (THERAGRAN) TABS Take 1 tablet by mouth daily      pravastatin (PRAVACHOL) 40 mg tablet Take 1 tablet (40 mg total) by mouth daily 90 tablet 3    Synthroid 100 MCG tablet Take 1 tablet (100 mcg total) by mouth daily 90 tablet 3    tamoxifen (NOLVADEX) 20 mg tablet Take 20 mg by mouth daily         No current facility-administered medications for this visit  No Known Allergies    Objective   Vitals: Blood pressure 120/78, pulse 68, height 5' 4" (1 626 m), weight 72 2 kg (159 lb 3 2 oz), not currently breastfeeding  Invasive Devices  Report    None                 Physical Exam  Vitals reviewed  Constitutional:       Appearance: Normal appearance  She is well-developed  HENT:      Head: Normocephalic and atraumatic  Eyes:      Extraocular Movements: Extraocular movements intact  Conjunctiva/sclera: Conjunctivae normal       Comments: No lid lag, stare, proptosis, or periorbital edema  Neck:      Thyroid: No thyromegaly  Vascular: No carotid bruit  Comments: Thyroid normal in size  No palpable thyroid nodules  Cardiovascular:      Rate and Rhythm: Normal rate and regular rhythm  Heart sounds: Normal heart sounds  No murmur heard  Pulmonary:      Effort: Pulmonary effort is normal       Breath sounds: Normal breath sounds  No wheezing  Abdominal:      Palpations: Abdomen is soft  Musculoskeletal:         General: No deformity  Normal range of motion        Cervical back: Normal range of motion and neck supple  Right lower leg: No edema  Left lower leg: No edema  Comments: No tremor of the outstretched hands  No spinous process tenderness  Lymphadenopathy:      Cervical: No cervical adenopathy  Skin:     General: Skin is warm and dry  Findings: No rash  Neurological:      Mental Status: She is alert and oriented to person, place, and time  Deep Tendon Reflexes: Reflexes are normal and symmetric  Comments: Patellar deep tendon reflexes normal          The history was obtained from the review of the chart and from the patient  Lab Results:   Blood work done on 07/07/2022 showed a TSH of 0 041 with a free T4 of 1 51  Hemoglobin A1c is 5 6%  CMP showed a glucose of 86 fasting, ALT 46, but was otherwise normal     CBC was normal       Lab Results   Component Value Date    CREATININE 0 58 07/07/2022    BUN 10 07/07/2022    K 4 4 07/07/2022     07/07/2022    CO2 24 07/07/2022     eGFR   Date Value Ref Range Status   07/07/2022 97 >59 mL/min/1 73 Final       Total cholesterol 147, LDL cholesterol 73    Lab Results   Component Value Date    HDL 52 07/07/2022    TRIG 123 07/07/2022       Lab Results   Component Value Date    ALT 46 (H) 07/07/2022    AST 36 07/07/2022       Lab Results   Component Value Date    TSH 0 041 (L) 07/07/2022    FREET4 1 51 07/07/2022       Future Appointments   Date Time Provider [place]   8/15/2023 10:00 AM Nancy Crain MD [facility]

## 2022-08-30 DIAGNOSIS — Z85.3 PERSONAL HISTORY OF BREAST CANCER: ICD-10-CM

## 2022-08-30 DIAGNOSIS — Z12.31 ENCOUNTER FOR SCREENING MAMMOGRAM FOR MALIGNANT NEOPLASM OF BREAST: ICD-10-CM

## 2022-09-06 DIAGNOSIS — E06.3 HYPOTHYROIDISM DUE TO HASHIMOTO'S THYROIDITIS: ICD-10-CM

## 2022-09-06 DIAGNOSIS — E03.8 HYPOTHYROIDISM DUE TO HASHIMOTO'S THYROIDITIS: ICD-10-CM

## 2022-09-06 RX ORDER — LEVOTHYROXINE SODIUM 100 MCG
100 TABLET ORAL DAILY
Qty: 90 TABLET | Refills: 3 | Status: SHIPPED | OUTPATIENT
Start: 2022-09-06

## 2023-01-23 LAB
T4 FREE SERPL-MCNC: 1.44 NG/DL (ref 0.82–1.77)
TSH SERPL DL<=0.005 MIU/L-ACNC: 1.23 UIU/ML (ref 0.45–4.5)

## 2023-04-03 DIAGNOSIS — E78.2 MIXED HYPERLIPIDEMIA: ICD-10-CM

## 2023-04-03 RX ORDER — PRAVASTATIN SODIUM 40 MG
40 TABLET ORAL DAILY
Qty: 90 TABLET | Refills: 3 | Status: SHIPPED | OUTPATIENT
Start: 2023-04-03

## 2023-05-03 NOTE — PROGRESS NOTES
Assessment/Plan:    Personal history of breast cancer  All well, no complaints  10 years from dx, MIKE  Normal breast and pelvic exams  Last pap 2017, now s/p hysterectomy  Mammo order given, last 8/26/22  Colonoscopy was due 2020, provider information given  Dexa followed by endocrine 8/26/22 Stable spine T-1 2; 4% improved hip T-0 4, fem neck T-0 4 (was T-1 1)  Low risk of hip fracture  DEMETRA (stress urinary incontinence, female)  Stable, intermittent, uses pads  Tolerable       Diagnoses and all orders for this visit:    Personal history of breast cancer  -     Mammo screening bilateral w 3d & cad; Future    DEMETRA (stress urinary incontinence, female)        Subjective:      Patient ID: Martha Martins is a 68 y o  female  HPI Here for annual follow up  The following portions of the patient's history were reviewed and updated as appropriate:   She  has a past medical history of Breast cancer ([facility] [address] ), H/O bone density study (06/15/2020), H/O mammogram (08/05/2021), Hemorrhoids, Hyperlipidemia, Hypothyroidism, Osteopenia, and Osteoporosis screening (06/15/2020)  She   Patient Active Problem List    Diagnosis Date Noted    DEMETRA (stress urinary incontinence, female) 05/02/2022    Encounter for gynecological examination (general) (routine) without abnormal findings 05/02/2022    Use of tamoxifen (Nolvadex) 04/29/2021    Osteopenia of multiple sites 07/24/2019    History of hysterectomy 10/2018    Hypothyroidism due to Hashimoto's thyroiditis 07/18/2018    Hyperlipidemia 07/18/2018    Family history of diabetes mellitus 07/18/2018    Personal history of breast cancer 2013     She  has a past surgical history that includes Breast lumpectomy (Left); Hysterectomy; Hysteroscopy w/ polypectomy (10/2015); and Colonoscopy (2015)    Her family history includes Cervical cancer in her mother; Depression in her father; Diabetes type II in her mother; Heart attack in her maternal grandfather; No Known Problems in "her daughter, maternal grandmother, paternal grandfather, paternal grandmother, and sister  She  reports that she has never smoked  She has never used smokeless tobacco  She reports that she does not drink alcohol and does not use drugs  Current Outpatient Medications   Medication Sig Dispense Refill    Calcium Carbonate (CALCIUM 600 PO) Take 2 capsules by mouth daily      Cholecalciferol (VITAMIN D3) 50 MCG (2000 UT) capsule Take 4,000 Units by mouth daily       meloxicam (MOBIC) 7 5 mg tablet Take 7 5 mg by mouth as needed      multivitamin (THERAGRAN) TABS Take 1 tablet by mouth daily      pravastatin (PRAVACHOL) 40 mg tablet Take 1 tablet (40 mg total) by mouth daily 90 tablet 3    Synthroid 100 MCG tablet Take 1 tablet (100 mcg total) by mouth daily 90 tablet 3    tamoxifen (NOLVADEX) 20 mg tablet Take 20 mg by mouth daily         No current facility-administered medications for this visit  She has No Known Allergies       Review of Systems  No breast, bladder, bowel changes   No new persistent pain, bloating, early satiety or pelvic pressure      Objective:      /60 (BP Location: Right arm, Patient Position: Sitting, Cuff Size: Large)   Ht 5' 4 25\" (1 632 m)   Wt 73 7 kg (162 lb 6 4 oz)   BMI 27 66 kg/m²          Physical Exam    General appearance: no distress, pleasant  Neck: thyroid without nodules or thyromegaly, no palpable adenopathy  Lymph nodes: no palpable adenopathy  Breasts: s/p left lumpectomy and XRT,  no masses, nodes or skin changes  Abdomen: soft, non tender, no palpable masses  Pelvic exam: normal atrophic external genitalia, urethral meatus normal, vagina atrophic without lesions grade I-II cystocele, cuff intact, no adnexal masses, non tender  Rectal exam: normal sphincter tone, no masses, RV confirms above    "

## 2023-05-04 ENCOUNTER — OFFICE VISIT (OUTPATIENT)
Dept: OBGYN CLINIC | Facility: CLINIC | Age: 73
End: 2023-05-04

## 2023-05-04 VITALS
HEIGHT: 64 IN | SYSTOLIC BLOOD PRESSURE: 120 MMHG | WEIGHT: 162.4 LBS | BODY MASS INDEX: 27.72 KG/M2 | DIASTOLIC BLOOD PRESSURE: 60 MMHG

## 2023-05-04 DIAGNOSIS — N39.3 SUI (STRESS URINARY INCONTINENCE, FEMALE): ICD-10-CM

## 2023-05-04 DIAGNOSIS — Z85.3 PERSONAL HISTORY OF BREAST CANCER: Primary | ICD-10-CM

## 2023-05-04 NOTE — PATIENT INSTRUCTIONS
Return to office in one year unless having any problems such as breast changes, bleeding, new persistent pain, new progressive bloating, new problems eating (getting full to quickly) or new constant urinary pressure that does not resolve in one week  Call in six months to schedule your annual visit    We recommend [facility]: [phone]  for your colonoscopy

## 2023-06-30 LAB
ALBUMIN SERPL-MCNC: 4 G/DL (ref 3.7–4.7)
ALBUMIN/GLOB SERPL: 1.8 {RATIO} (ref 1.2–2.2)
ALP SERPL-CCNC: 51 IU/L (ref 44–121)
ALT SERPL-CCNC: 62 IU/L (ref 0–32)
AST SERPL-CCNC: 49 IU/L (ref 0–40)
BASOPHILS # BLD AUTO: 0 X10E3/UL (ref 0–0.2)
BASOPHILS NFR BLD AUTO: 0 %
BILIRUB SERPL-MCNC: 0.5 MG/DL (ref 0–1.2)
BUN SERPL-MCNC: 9 MG/DL (ref 8–27)
BUN/CREAT SERPL: 20 (ref 12–28)
CALCIUM SERPL-MCNC: 8.5 MG/DL (ref 8.7–10.3)
CHLORIDE SERPL-SCNC: 102 MMOL/L (ref 96–106)
CO2 SERPL-SCNC: 21 MMOL/L (ref 20–29)
CREAT SERPL-MCNC: 0.44 MG/DL (ref 0.57–1)
EGFR: 103 ML/MIN/1.73
EOSINOPHIL # BLD AUTO: 0.2 X10E3/UL (ref 0–0.4)
EOSINOPHIL NFR BLD AUTO: 3 %
ERYTHROCYTE [DISTWIDTH] IN BLOOD BY AUTOMATED COUNT: 12.8 % (ref 11.7–15.4)
GLOBULIN SER-MCNC: 2.2 G/DL (ref 1.5–4.5)
GLUCOSE SERPL-MCNC: 89 MG/DL (ref 70–99)
HCT VFR BLD AUTO: 39.7 % (ref 34–46.6)
HGB BLD-MCNC: 13.5 G/DL (ref 11.1–15.9)
IMM GRANULOCYTES # BLD: 0 X10E3/UL (ref 0–0.1)
IMM GRANULOCYTES NFR BLD: 0 %
LYMPHOCYTES # BLD AUTO: 2.8 X10E3/UL (ref 0.7–3.1)
LYMPHOCYTES NFR BLD AUTO: 34 %
MCH RBC QN AUTO: 31.6 PG (ref 26.6–33)
MCHC RBC AUTO-ENTMCNC: 34 G/DL (ref 31.5–35.7)
MCV RBC AUTO: 93 FL (ref 79–97)
MONOCYTES # BLD AUTO: 0.6 X10E3/UL (ref 0.1–0.9)
MONOCYTES NFR BLD AUTO: 8 %
NEUTROPHILS # BLD AUTO: 4.4 X10E3/UL (ref 1.4–7)
NEUTROPHILS NFR BLD AUTO: 55 %
PLATELET # BLD AUTO: 188 X10E3/UL (ref 150–450)
POTASSIUM SERPL-SCNC: 4.1 MMOL/L (ref 3.5–5.2)
PROT SERPL-MCNC: 6.2 G/DL (ref 6–8.5)
RBC # BLD AUTO: 4.27 X10E6/UL (ref 3.77–5.28)
SODIUM SERPL-SCNC: 136 MMOL/L (ref 134–144)
T4 FREE SERPL-MCNC: 1.39 NG/DL (ref 0.82–1.77)
TSH SERPL DL<=0.005 MIU/L-ACNC: 1.66 UIU/ML (ref 0.45–4.5)
WBC # BLD AUTO: 8.1 X10E3/UL (ref 3.4–10.8)

## 2023-08-15 ENCOUNTER — OFFICE VISIT (OUTPATIENT)
Dept: ENDOCRINOLOGY | Facility: HOSPITAL | Age: 73
End: 2023-08-15
Payer: COMMERCIAL

## 2023-08-15 VITALS
SYSTOLIC BLOOD PRESSURE: 116 MMHG | WEIGHT: 164.4 LBS | BODY MASS INDEX: 28.07 KG/M2 | HEART RATE: 66 BPM | HEIGHT: 64 IN | DIASTOLIC BLOOD PRESSURE: 74 MMHG

## 2023-08-15 DIAGNOSIS — E06.3 HYPOTHYROIDISM DUE TO HASHIMOTO'S THYROIDITIS: Primary | ICD-10-CM

## 2023-08-15 DIAGNOSIS — E78.2 MIXED HYPERLIPIDEMIA: ICD-10-CM

## 2023-08-15 DIAGNOSIS — Z83.3 FAMILY HISTORY OF DIABETES MELLITUS: ICD-10-CM

## 2023-08-15 DIAGNOSIS — E03.8 HYPOTHYROIDISM DUE TO HASHIMOTO'S THYROIDITIS: Primary | ICD-10-CM

## 2023-08-15 DIAGNOSIS — M85.89 OSTEOPENIA OF MULTIPLE SITES: ICD-10-CM

## 2023-08-15 PROCEDURE — 99214 OFFICE O/P EST MOD 30 MIN: CPT | Performed by: INTERNAL MEDICINE

## 2023-08-15 RX ORDER — NICOTINE POLACRILEX 2 MG
GUM BUCCAL
COMMUNITY

## 2023-08-15 RX ORDER — VIT C/B6/B5/MAGNESIUM/HERB 173 50-5-6-5MG
CAPSULE ORAL DAILY
COMMUNITY

## 2023-08-15 NOTE — PATIENT INSTRUCTIONS
The blood work is good. No change in thyroid medicine. The liver tests remain slightly elevated, they go up and down. Follow up in 1 year with blood work. Dexa scan is due end of august 2024.

## 2023-08-15 NOTE — PROGRESS NOTES
8/15/2023    Assessment/Plan      Diagnoses and all orders for this visit:    Hypothyroidism due to Hashimoto's thyroiditis  -     Comprehensive metabolic panel Lab Collect; Future  -     CBC and differential Lab Collect; Future  -     TSH, 3rd generation Lab Collect; Future  -     T4, free Lab Collect; Future  -     Lipid Panel with Direct LDL reflex Lab Collect; Future  -     Comprehensive metabolic panel Lab Collect  -     CBC and differential Lab Collect  -     TSH, 3rd generation Lab Collect  -     T4, free Lab Collect  -     Lipid Panel with Direct LDL reflex Lab Collect    Mixed hyperlipidemia  -     Comprehensive metabolic panel Lab Collect; Future  -     CBC and differential Lab Collect; Future  -     TSH, 3rd generation Lab Collect; Future  -     T4, free Lab Collect; Future  -     Lipid Panel with Direct LDL reflex Lab Collect; Future  -     Comprehensive metabolic panel Lab Collect  -     CBC and differential Lab Collect  -     TSH, 3rd generation Lab Collect  -     T4, free Lab Collect  -     Lipid Panel with Direct LDL reflex Lab Collect    Osteopenia of multiple sites  -     Comprehensive metabolic panel Lab Collect; Future  -     CBC and differential Lab Collect; Future  -     TSH, 3rd generation Lab Collect; Future  -     T4, free Lab Collect; Future  -     Lipid Panel with Direct LDL reflex Lab Collect; Future  -     Comprehensive metabolic panel Lab Collect  -     CBC and differential Lab Collect  -     TSH, 3rd generation Lab Collect  -     T4, free Lab Collect  -     Lipid Panel with Direct LDL reflex Lab Collect    Family history of diabetes mellitus  -     Comprehensive metabolic panel Lab Collect; Future  -     CBC and differential Lab Collect; Future  -     TSH, 3rd generation Lab Collect; Future  -     T4, free Lab Collect; Future  -     Lipid Panel with Direct LDL reflex Lab Collect;  Future  -     Comprehensive metabolic panel Lab Collect  -     CBC and differential Lab Collect  -     TSH, 3rd generation Lab Collect  -     T4, free Lab Collect  -     Lipid Panel with Direct LDL reflex Lab Collect    Other orders  -     Biotin 1 MG CAPS; Take by mouth prn  -     Turmeric 500 MG CAPS; Take by mouth in the morning        Assessment/Plan:  1. Hypothyroidism due to Hashimoto's thyroiditis. Blood work demonstrates normal thyroid function studies signifying biochemical euthyroidism. She will continue the same Synthroid 100 mcg daily. 2.  Osteopenia. She will be due for DEXA scan at the end of August 2024 and we can order that next year. She will continue the same calcium and vitamin D replacement and weightbearing exercises. 3.  Hyperlipidemia. She will continue the same pravastatin 40 mg daily. 4.  Family history of diabetes. Most recent blood sugars have been normal.    I have asked her to follow-up in 1 year with preceding CBC, CMP, TSH, free T4, and lipid panel. CC: Hypothyroid, osteopenia, lipid follow-up    History of Present Illness     HPI: Sander Baron is a 68y.o. year old female with history of hypothyroidism due to Hashimoto's thyroiditis, osteopenia, hyperlipidemia, family history of diabetes for follow-up visit. She was diagnosed between 8-13 years ago with hypothyroidism due to Hashimoto's thyroiditis and has been on thyroid hormone for replacement purposes, brand-name Synthroid ever since. She is currently on brand-name Synthroid 100 mcg daily. She denies heat or cold intolerance but still has the occasional hot flash. She denies palpitation, tremors, or fatigue. Weight is 5 pounds more than last visit. She has some occasional constipation at times. She denies diarrhea, insomnia, anxiety or depression. She has brittle nails but no hair loss or dry skin. She denies diplopia. She denies compressive thyroid symptoms or difficulties with swallowing. She is a history of osteopenia. She stopped tamoxifen as it has been 10 years.   She is currently on calcium carbonate 600 mg 2 capsules daily and vitamin-D replacement with 4000 units daily. She has hyperlipidemia and is currently on pravastatin 40 mg daily as simvastatin was discontinued due to elevated liver function tests by her PCP. This is treated by PCP. She denies chest pain or shortness of breath. She has a family history of type 2 diabetes. She denies polyuria, polydipsia, polyphasia but has once a night nocturia. Review of Systems   Constitutional: Negative for fatigue and unexpected weight change. Weight 5 pounds more than last visit. Rare short nap if needed. HENT: Negative for trouble swallowing. Eyes: Negative for visual disturbance. No diplopia. Respiratory: Negative for chest tightness and shortness of breath. Cardiovascular: Negative for chest pain and palpitations. Gastrointestinal: Positive for constipation. Negative for abdominal pain, diarrhea and nausea. Constipation at times. Endocrine: Negative for cold intolerance, heat intolerance, polydipsia, polyphagia and polyuria. Still occasional hot flash/flush, not as bad off tamoxifen. Nocturia once a night. Skin: Negative for rash. No dry skin. Has brittle nails. No hair loss. Using biotin. Neurological: Negative for dizziness, tremors, light-headedness, numbness and headaches. Psychiatric/Behavioral: Negative for dysphoric mood and sleep disturbance. The patient is not nervous/anxious. Will wake at night at times.         Historical Information   Past Medical History:   Diagnosis Date   • Breast cancer ([address])     left, had lumpectomy and XRT,stage 0   • H/O bone density study 06/15/2020    Osteopenia   • H/O mammogram 08/05/2021    BIRADS 2B   • Hemorrhoids    • Hyperlipidemia    • Hypothyroidism    • Osteopenia     in the past   • Osteoporosis screening 06/15/2020    Managed by endocrine, off Fosamax 2014, dexa due 2021     Past Surgical History:   Procedure Laterality Date • BREAST LUMPECTOMY Left    • CATARACT EXTRACTION Bilateral 2023    left july and right august 2023   • COLONOSCOPY  2015    Due 2020   • HYSTERECTOMY     • HYSTEROSCOPY W/ POLYPECTOMY  10/2015     Social History   Social History     Substance and Sexual Activity   Alcohol Use No     Social History     Substance and Sexual Activity   Drug Use No     Social History     Tobacco Use   Smoking Status Never   Smokeless Tobacco Never     Family History:   Family History   Problem Relation Age of Onset   • Cervical cancer Mother    • Diabetes type II Mother    • Depression Father    • No Known Problems Sister    • No Known Problems Daughter    • No Known Problems Maternal Grandmother    • Heart attack Maternal Grandfather    • No Known Problems Paternal Grandmother    • No Known Problems Paternal Grandfather    • Breast cancer Neg Hx    • Uterine cancer Neg Hx    • Ovarian cancer Neg Hx    • Colon cancer Neg Hx        Meds/Allergies   Current Outpatient Medications   Medication Sig Dispense Refill   • Biotin 1 MG CAPS Take by mouth prn     • Calcium Carbonate (CALCIUM 600 PO) Take 2 capsules by mouth daily     • Cholecalciferol (VITAMIN D3) 50 MCG (2000 UT) capsule Take 4,000 Units by mouth daily      • meloxicam (MOBIC) 7.5 mg tablet Take 7.5 mg by mouth as needed     • multivitamin (THERAGRAN) TABS Take 1 tablet by mouth daily     • pravastatin (PRAVACHOL) 40 mg tablet Take 1 tablet (40 mg total) by mouth daily 90 tablet 3   • Synthroid 100 MCG tablet Take 1 tablet (100 mcg total) by mouth daily 90 tablet 3   • Turmeric 500 MG CAPS Take by mouth in the morning     • tamoxifen (NOLVADEX) 20 mg tablet Take 20 mg by mouth daily   (Patient not taking: Reported on 8/15/2023)       No current facility-administered medications for this visit. No Known Allergies    Objective   Vitals: Blood pressure 116/74, pulse 66, height 5' 4.25" (1.632 m), weight 74.6 kg (164 lb 6.4 oz), not currently breastfeeding.   Invasive Devices None                 Physical Exam  Vitals reviewed. Constitutional:       Appearance: Normal appearance. She is well-developed. HENT:      Head: Normocephalic and atraumatic. Eyes:      Extraocular Movements: Extraocular movements intact. Conjunctiva/sclera: Conjunctivae normal.      Comments: No lid lag, stare, proptosis, or periorbital edema. Neck:      Thyroid: No thyromegaly. Vascular: No carotid bruit. Comments: Thyroid normal in size. No palpable thyroid nodules. Cardiovascular:      Rate and Rhythm: Normal rate and regular rhythm. Heart sounds: Normal heart sounds. No murmur heard. Pulmonary:      Effort: Pulmonary effort is normal.      Breath sounds: Normal breath sounds. No wheezing. Abdominal:      Palpations: Abdomen is soft. Musculoskeletal:         General: No deformity. Cervical back: Normal range of motion and neck supple. Right lower leg: No edema. Left lower leg: No edema. Comments: No tremor of the outstretched hands. Lymphadenopathy:      Cervical: No cervical adenopathy. Skin:     General: Skin is warm and dry. Findings: No rash. Neurological:      Mental Status: She is alert and oriented to person, place, and time. Deep Tendon Reflexes: Reflexes are normal and symmetric. Comments: Patellar deep tendon reflexes normal.         The history was obtained from the review of the chart and from the patient. Lab Results:   Blood work performed on 6/29/2023 showed a TSH of 1.66 with a free T4 of 1.39.     CBC is normal.    CMP showed a glucose of 89 fasting, calcium 8.5 with an albumin of 4, AST 49, ALT 62, but was otherwise normal.    Lab Results   Component Value Date    CREATININE 0.44 (L) 06/29/2023    CREATININE 0.58 07/07/2022    BUN 9 06/29/2023    K 4.1 06/29/2023     06/29/2023    CO2 21 06/29/2023     eGFR   Date Value Ref Range Status   06/29/2023 103 >59 mL/min/1.73 Final         Lab Results   Component Value Date    HDL 52 07/07/2022    TRIG 123 07/07/2022       Lab Results   Component Value Date    ALT 62 (H) 06/29/2023    AST 49 (H) 06/29/2023       Lab Results   Component Value Date    TSH 1.660 06/29/2023    FREET4 1.39 06/29/2023             Future Appointments   Date Time Provider [address] [place] [address]   8/13/2024 10:00 AM Oscar Bullard MD [facility]

## 2023-08-26 DIAGNOSIS — E06.3 HYPOTHYROIDISM DUE TO HASHIMOTO'S THYROIDITIS: ICD-10-CM

## 2023-08-26 DIAGNOSIS — E03.8 HYPOTHYROIDISM DUE TO HASHIMOTO'S THYROIDITIS: ICD-10-CM

## 2023-08-28 RX ORDER — LEVOTHYROXINE SODIUM 100 MCG
100 TABLET ORAL DAILY
Qty: 90 TABLET | Refills: 2 | Status: SHIPPED | OUTPATIENT
Start: 2023-08-28

## 2024-01-11 DIAGNOSIS — E03.8 HYPOTHYROIDISM DUE TO HASHIMOTO'S THYROIDITIS: ICD-10-CM

## 2024-01-11 DIAGNOSIS — E06.3 HYPOTHYROIDISM DUE TO HASHIMOTO'S THYROIDITIS: ICD-10-CM

## 2024-01-11 RX ORDER — LEVOTHYROXINE SODIUM 100 MCG
100 TABLET ORAL DAILY
Qty: 90 TABLET | Refills: 2 | Status: SHIPPED | OUTPATIENT
Start: 2024-01-11

## 2024-01-20 DIAGNOSIS — E78.2 MIXED HYPERLIPIDEMIA: ICD-10-CM

## 2024-01-22 RX ORDER — PRAVASTATIN SODIUM 40 MG
40 TABLET ORAL DAILY
Qty: 90 TABLET | Refills: 3 | Status: SHIPPED | OUTPATIENT
Start: 2024-01-22

## 2024-02-21 PROBLEM — Z01.419 ENCOUNTER FOR GYNECOLOGICAL EXAMINATION (GENERAL) (ROUTINE) WITHOUT ABNORMAL FINDINGS: Status: RESOLVED | Noted: 2022-05-02 | Resolved: 2024-02-21

## 2024-05-04 NOTE — PROGRESS NOTES
Assessment/Plan:    Encounter for gynecological examination (general) (routine) without abnormal findings  Here for well check, no breast or gyn complaints.   No health history changes.  Normal breast and pelvic exams s/p LAVH BSO with A&P  Last pap 2017, neg; no further indicated  Mammo order given, last 8/26/22; reviewed on [employer]  Colonoscopy 2015 was due 2020; encouraged to schedule, agrees. Has provider information  Dexa with endocrine, 8/2022 Stable spine T-1.2; 4% improved hip T-0.4, fem neck T-0.4 (was T-1.1). Low risk of hip fracture.    Personal history of breast cancer - left 2013  11 years from diagnosis and tx; [facility].       Diagnoses and all orders for this visit:    Personal history of breast cancer  -     Mammo screening bilateral w 3d & cad; Future    Encounter for screening mammogram for malignant neoplasm of breast  -     Mammo screening bilateral w 3d & cad; Future    Encounter for gynecological examination (general) (routine) without abnormal findings    Other orders  -     triamcinolone (KENALOG) 0.1 % cream; 1 Application Every 12 hours  -     Ascorbic Acid (Vitamin C) 500 MG CAPS; Take by mouth          Subjective:      Patient ID: Sharifa Ralph is a 74 y.o. female.    HPI Here for Medicare biannual breast and pelvic exams.     The following portions of the patient's history were reviewed and updated as appropriate: She  has a past medical history of Breast cancer (HCC), H/O bone density study (06/15/2020), H/O mammogram (08/05/2021), Hemorrhoids, Hyperlipidemia, Hypothyroidism, Osteopenia, and Osteoporosis screening (06/15/2020).  She   Patient Active Problem List    Diagnosis Date Noted    DEMETRA (stress urinary incontinence, female) 05/02/2022    Use of tamoxifen (Nolvadex) 04/29/2021    Osteopenia of multiple sites 07/24/2019    History of hysterectomy 10/2018    Hypothyroidism due to Hashimoto's thyroiditis 07/18/2018    Hyperlipidemia 07/18/2018    Family history of diabetes mellitus  "07/18/2018    Personal history of breast cancer - left 2013 2013     She  has a past surgical history that includes Breast lumpectomy (Left); Hysterectomy; Hysteroscopy w/ polypectomy (10/2015); Colonoscopy (2015); and Cataract extraction (Bilateral, 2023).  Her family history includes Cervical cancer in her mother; Depression in her father; Diabetes type II in her mother; Heart attack in her maternal grandfather; No Known Problems in her daughter, maternal grandmother, paternal grandfather, paternal grandmother, and sister.  She  reports that she has never smoked. She has never used smokeless tobacco. She reports that she does not drink alcohol and does not use drugs.  Current Outpatient Medications   Medication Sig Dispense Refill    Ascorbic Acid (Vitamin C) 500 MG CAPS Take by mouth      Biotin 1 MG CAPS Take by mouth prn      Calcium Carbonate (CALCIUM 600 PO) Take 2 capsules by mouth daily      Cholecalciferol (VITAMIN D3) 50 MCG (2000 UT) capsule Take 4,000 Units by mouth daily       meloxicam (MOBIC) 7.5 mg tablet Take 7.5 mg by mouth as needed      multivitamin (THERAGRAN) TABS Take 1 tablet by mouth daily      pravastatin (PRAVACHOL) 40 mg tablet TAKE ONE TABLET BY MOUTH DAILY 90 tablet 3    Synthroid 100 MCG tablet Take 1 tablet (100 mcg total) by mouth daily 90 tablet 2    triamcinolone (KENALOG) 0.1 % cream 1 Application Every 12 hours      Turmeric 500 MG CAPS Take by mouth in the morning       No current facility-administered medications for this visit.     She has No Known Allergies..    Review of Systems  No breast, bladder, bowel changes. No new persistent pain, bloating, early satiety or pelvic pressure      Objective:      /60 (BP Location: Left arm, Patient Position: Sitting, Cuff Size: Large)   Ht 5' 3.75\" (1.619 m)   Wt 77 kg (169 lb 12.8 oz)   BMI 29.38 kg/m²          Physical Exam    General appearance: no distress, pleasant  Neck: thyroid without nodules or thyromegaly, no palpable " adenopathy  Lymph nodes: no palpable adenopathy  Breasts: no masses, nodes or skin changes, s/p left lumpectomy and XRT  Abdomen: soft, non tender, no palpable masses  Pelvic exam: normal atrophic external genitalia, urethral meatus normal, vagina atrophic with stable grade II cystocele, cuff intact, no adnexal masses, non tender  Rectal exam: normal sphincter tone, no masses, RV confirms above

## 2024-05-09 ENCOUNTER — ANNUAL EXAM (OUTPATIENT)
Dept: OBGYN CLINIC | Facility: CLINIC | Age: 74
End: 2024-05-09
Payer: COMMERCIAL

## 2024-05-09 VITALS
BODY MASS INDEX: 28.99 KG/M2 | HEIGHT: 64 IN | WEIGHT: 169.8 LBS | DIASTOLIC BLOOD PRESSURE: 60 MMHG | SYSTOLIC BLOOD PRESSURE: 110 MMHG

## 2024-05-09 DIAGNOSIS — Z01.419 ENCOUNTER FOR GYNECOLOGICAL EXAMINATION (GENERAL) (ROUTINE) WITHOUT ABNORMAL FINDINGS: Primary | ICD-10-CM

## 2024-05-09 DIAGNOSIS — Z12.31 ENCOUNTER FOR SCREENING MAMMOGRAM FOR MALIGNANT NEOPLASM OF BREAST: ICD-10-CM

## 2024-05-09 DIAGNOSIS — Z85.3 PERSONAL HISTORY OF BREAST CANCER: ICD-10-CM

## 2024-05-09 PROCEDURE — G0101 CA SCREEN;PELVIC/BREAST EXAM: HCPCS | Performed by: OBSTETRICS & GYNECOLOGY

## 2024-05-09 RX ORDER — TRIAMCINOLONE ACETONIDE 1 MG/G
1 CREAM TOPICAL EVERY 12 HOURS
COMMUNITY
Start: 2024-01-12

## 2024-05-09 RX ORDER — MULTIVIT-MIN/IRON/FOLIC ACID/K 18-600-40
CAPSULE ORAL
COMMUNITY

## 2024-05-09 NOTE — ASSESSMENT & PLAN NOTE
Here for well check, no breast or gyn complaints.   No health history changes.  Normal breast and pelvic exams s/p LAVH BSO with A&P  Last pap 2017, neg; no further indicated  Mammo order given, last 8/26/22; reviewed on [employer]  Colonoscopy 2015 was due 2020; encouraged to schedule, agrees. Has provider information  Dexa with endocrine, 8/2022 Stable spine T-1.2; 4% improved hip T-0.4, fem neck T-0.4 (was T-1.1). Low risk of hip fracture.

## 2024-05-09 NOTE — PATIENT INSTRUCTIONS
Return to office in one year unless having any problems such as breast changes, bleeding, new persistent pain, new progressive bloating, new problems eating (getting full to quickly) or new constant urinary pressure that does not resolve in one week.    Continue to strive for 1200 mg of calcium and 1000 IU of vitamin D daily in diet and supplements combined for your bone health.  You can only absorb 600 mg of calcium at a time. Avoid excess calcium as this may adversely effect your heart.  There are 400 mg of calcium in an 8 oz serving of dairy.  Taiban milk has 600 mg of calcium in an 8 oz serving.

## 2024-05-09 NOTE — LETTER
May 9, 2024     Ricarda Ocampo DO  [address]  [facility] [address]    Patient: Sharifa Ralph   YOB: 1950   Date of Visit: 5/9/2024       Dear Dr. Ocampo:    Thank you for referring Sharifa Ralph to me for evaluation. Below are my notes for this consultation.    If you have questions, please do not hesitate to call me. I look forward to following your patient along with you.         Sincerely,        Olimpia Driscoll MD        CC: No Recipients    Olimpia Driscoll MD  5/9/2024  1:22 PM  Sign when Signing Visit  Assessment/Plan:    Encounter for gynecological examination (general) (routine) without abnormal findings  Here for well check, no breast or gyn complaints.   No health history changes.  Normal breast and pelvic exams s/p LAVH BSO with A&P  Last pap 2017, neg; no further indicated  Mammo order given, last 8/26/22; reviewed on [employer]  Colonoscopy 2015 was due 2020; encouraged to schedule, agrees. Has provider information  Dexa with endocrine, 8/2022 Stable spine T-1.2; 4% improved hip T-0.4, fem neck T-0.4 (was T-1.1). Low risk of hip fracture.    Personal history of breast cancer - left 2013  11 years from diagnosis and tx; [facility].       Diagnoses and all orders for this visit:    Personal history of breast cancer  -     Mammo screening bilateral w 3d & cad; Future    Encounter for screening mammogram for malignant neoplasm of breast  -     Mammo screening bilateral w 3d & cad; Future    Encounter for gynecological examination (general) (routine) without abnormal findings    Other orders  -     triamcinolone (KENALOG) 0.1 % cream; 1 Application Every 12 hours  -     Ascorbic Acid (Vitamin C) 500 MG CAPS; Take by mouth          Subjective:      Patient ID: Sharifa Ralph is a 74 y.o. female.    HPI Here for Medicare biannual breast and pelvic exams.     The following portions of the patient's history were reviewed and updated as appropriate: She  has a past medical history of Breast  cancer (HCC), H/O bone density study (06/15/2020), H/O mammogram (08/05/2021), Hemorrhoids, Hyperlipidemia, Hypothyroidism, Osteopenia, and Osteoporosis screening (06/15/2020).  She   Patient Active Problem List    Diagnosis Date Noted   • DEMETRA (stress urinary incontinence, female) 05/02/2022   • Use of tamoxifen (Nolvadex) 04/29/2021   • Osteopenia of multiple sites 07/24/2019   • History of hysterectomy 10/2018   • Hypothyroidism due to Hashimoto's thyroiditis 07/18/2018   • Hyperlipidemia 07/18/2018   • Family history of diabetes mellitus 07/18/2018   • Personal history of breast cancer - left 2013 2013     She  has a past surgical history that includes Breast lumpectomy (Left); Hysterectomy; Hysteroscopy w/ polypectomy (10/2015); Colonoscopy (2015); and Cataract extraction (Bilateral, 2023).  Her family history includes Cervical cancer in her mother; Depression in her father; Diabetes type II in her mother; Heart attack in her maternal grandfather; No Known Problems in her daughter, maternal grandmother, paternal grandfather, paternal grandmother, and sister.  She  reports that she has never smoked. She has never used smokeless tobacco. She reports that she does not drink alcohol and does not use drugs.  Current Outpatient Medications   Medication Sig Dispense Refill   • Ascorbic Acid (Vitamin C) 500 MG CAPS Take by mouth     • Biotin 1 MG CAPS Take by mouth prn     • Calcium Carbonate (CALCIUM 600 PO) Take 2 capsules by mouth daily     • Cholecalciferol (VITAMIN D3) 50 MCG (2000 UT) capsule Take 4,000 Units by mouth daily      • meloxicam (MOBIC) 7.5 mg tablet Take 7.5 mg by mouth as needed     • multivitamin (THERAGRAN) TABS Take 1 tablet by mouth daily     • pravastatin (PRAVACHOL) 40 mg tablet TAKE ONE TABLET BY MOUTH DAILY 90 tablet 3   • Synthroid 100 MCG tablet Take 1 tablet (100 mcg total) by mouth daily 90 tablet 2   • triamcinolone (KENALOG) 0.1 % cream 1 Application Every 12 hours     • Turmeric 500 MG  "CAPS Take by mouth in the morning       No current facility-administered medications for this visit.     She has No Known Allergies..    Review of Systems  No breast, bladder, bowel changes. No new persistent pain, bloating, early satiety or pelvic pressure      Objective:      /60 (BP Location: Left arm, Patient Position: Sitting, Cuff Size: Large)   Ht 5' 3.75\" (1.619 m)   Wt 77 kg (169 lb 12.8 oz)   BMI 29.38 kg/m²          Physical Exam    General appearance: no distress, pleasant  Neck: thyroid without nodules or thyromegaly, no palpable adenopathy  Lymph nodes: no palpable adenopathy  Breasts: no masses, nodes or skin changes, s/p left lumpectomy and XRT  Abdomen: soft, non tender, no palpable masses  Pelvic exam: normal atrophic external genitalia, urethral meatus normal, vagina atrophic with stable grade II cystocele, cuff intact, no adnexal masses, non tender  Rectal exam: normal sphincter tone, no masses, RV confirms above    "

## 2024-05-23 ENCOUNTER — VBI (OUTPATIENT)
Dept: ADMINISTRATIVE | Facility: OTHER | Age: 74
End: 2024-05-23

## 2024-08-02 LAB
ALBUMIN SERPL-MCNC: 4.3 G/DL (ref 3.8–4.8)
ALP SERPL-CCNC: 93 IU/L (ref 44–121)
ALT SERPL-CCNC: 26 IU/L (ref 0–32)
AST SERPL-CCNC: 24 IU/L (ref 0–40)
BASOPHILS # BLD AUTO: 0 X10E3/UL (ref 0–0.2)
BASOPHILS NFR BLD AUTO: 1 %
BILIRUB SERPL-MCNC: 0.7 MG/DL (ref 0–1.2)
BUN SERPL-MCNC: 10 MG/DL (ref 8–27)
BUN/CREAT SERPL: 19 (ref 12–28)
CALCIUM SERPL-MCNC: 9.3 MG/DL (ref 8.7–10.3)
CHLORIDE SERPL-SCNC: 104 MMOL/L (ref 96–106)
CHOLEST SERPL-MCNC: 178 MG/DL (ref 100–199)
CO2 SERPL-SCNC: 23 MMOL/L (ref 20–29)
CREAT SERPL-MCNC: 0.52 MG/DL (ref 0.57–1)
EGFR: 98 ML/MIN/1.73
EOSINOPHIL # BLD AUTO: 0.2 X10E3/UL (ref 0–0.4)
EOSINOPHIL NFR BLD AUTO: 3 %
ERYTHROCYTE [DISTWIDTH] IN BLOOD BY AUTOMATED COUNT: 13.7 % (ref 11.7–15.4)
GLOBULIN SER-MCNC: 2.8 G/DL (ref 1.5–4.5)
GLUCOSE SERPL-MCNC: 98 MG/DL (ref 70–99)
HCT VFR BLD AUTO: 47.9 % (ref 34–46.6)
HDLC SERPL-MCNC: 51 MG/DL
HGB BLD-MCNC: 15.1 G/DL (ref 11.1–15.9)
IMM GRANULOCYTES # BLD: 0 X10E3/UL (ref 0–0.1)
IMM GRANULOCYTES NFR BLD: 0 %
LDLC SERPL CALC-MCNC: 100 MG/DL (ref 0–99)
LDLC/HDLC SERPL: 2 RATIO (ref 0–3.2)
LYMPHOCYTES # BLD AUTO: 3.7 X10E3/UL (ref 0.7–3.1)
LYMPHOCYTES NFR BLD AUTO: 44 %
MCH RBC QN AUTO: 30.2 PG (ref 26.6–33)
MCHC RBC AUTO-ENTMCNC: 31.5 G/DL (ref 31.5–35.7)
MCV RBC AUTO: 96 FL (ref 79–97)
MONOCYTES # BLD AUTO: 0.7 X10E3/UL (ref 0.1–0.9)
MONOCYTES NFR BLD AUTO: 8 %
NEUTROPHILS # BLD AUTO: 3.7 X10E3/UL (ref 1.4–7)
NEUTROPHILS NFR BLD AUTO: 44 %
PLATELET # BLD AUTO: 260 X10E3/UL (ref 150–450)
POTASSIUM SERPL-SCNC: 4.5 MMOL/L (ref 3.5–5.2)
PROT SERPL-MCNC: 7.1 G/DL (ref 6–8.5)
RBC # BLD AUTO: 5 X10E6/UL (ref 3.77–5.28)
SL AMB VLDL CHOLESTEROL CALC: 27 MG/DL (ref 5–40)
SODIUM SERPL-SCNC: 142 MMOL/L (ref 134–144)
T4 FREE SERPL-MCNC: 1.64 NG/DL (ref 0.82–1.77)
TRIGL SERPL-MCNC: 158 MG/DL (ref 0–149)
TSH SERPL DL<=0.005 MIU/L-ACNC: 0.5 UIU/ML (ref 0.45–4.5)
WBC # BLD AUTO: 8.4 X10E3/UL (ref 3.4–10.8)

## 2024-08-13 ENCOUNTER — OFFICE VISIT (OUTPATIENT)
Dept: ENDOCRINOLOGY | Facility: HOSPITAL | Age: 74
End: 2024-08-13
Payer: COMMERCIAL

## 2024-08-13 VITALS
WEIGHT: 168.4 LBS | BODY MASS INDEX: 28.75 KG/M2 | HEIGHT: 64 IN | SYSTOLIC BLOOD PRESSURE: 128 MMHG | DIASTOLIC BLOOD PRESSURE: 70 MMHG | OXYGEN SATURATION: 97 % | HEART RATE: 64 BPM

## 2024-08-13 DIAGNOSIS — Z79.810 USE OF TAMOXIFEN (NOLVADEX): ICD-10-CM

## 2024-08-13 DIAGNOSIS — E78.2 MIXED HYPERLIPIDEMIA: ICD-10-CM

## 2024-08-13 DIAGNOSIS — E03.8 HYPOTHYROIDISM DUE TO HASHIMOTO'S THYROIDITIS: Primary | ICD-10-CM

## 2024-08-13 DIAGNOSIS — M85.89 OSTEOPENIA OF MULTIPLE SITES: ICD-10-CM

## 2024-08-13 DIAGNOSIS — Z83.3 FAMILY HISTORY OF DIABETES MELLITUS: ICD-10-CM

## 2024-08-13 DIAGNOSIS — E06.3 HYPOTHYROIDISM DUE TO HASHIMOTO'S THYROIDITIS: Primary | ICD-10-CM

## 2024-08-13 PROCEDURE — 99214 OFFICE O/P EST MOD 30 MIN: CPT | Performed by: INTERNAL MEDICINE

## 2024-08-13 NOTE — PROGRESS NOTES
8/13/2024    Assessment & Plan      Diagnoses and all orders for this visit:    Hypothyroidism due to Hashimoto's thyroiditis  -     Comprehensive metabolic panel; Future  -     CBC and differential; Future  -     T4, free; Future  -     TSH, 3rd generation; Future  -     Lipid Panel with Direct LDL reflex; Future  -     Comprehensive metabolic panel  -     CBC and differential  -     T4, free  -     TSH, 3rd generation  -     Lipid Panel with Direct LDL reflex    Osteopenia of multiple sites  -     Comprehensive metabolic panel; Future  -     CBC and differential; Future  -     T4, free; Future  -     TSH, 3rd generation; Future  -     Lipid Panel with Direct LDL reflex; Future  -     Comprehensive metabolic panel  -     CBC and differential  -     T4, free  -     TSH, 3rd generation  -     Lipid Panel with Direct LDL reflex  -     DXA bone density spine hip and pelvis; Future    Family history of diabetes mellitus  -     Comprehensive metabolic panel; Future  -     CBC and differential; Future  -     T4, free; Future  -     TSH, 3rd generation; Future  -     Lipid Panel with Direct LDL reflex; Future  -     Comprehensive metabolic panel  -     CBC and differential  -     T4, free  -     TSH, 3rd generation  -     Lipid Panel with Direct LDL reflex    Mixed hyperlipidemia  -     Comprehensive metabolic panel; Future  -     CBC and differential; Future  -     T4, free; Future  -     TSH, 3rd generation; Future  -     Lipid Panel with Direct LDL reflex; Future  -     Comprehensive metabolic panel  -     CBC and differential  -     T4, free  -     TSH, 3rd generation  -     Lipid Panel with Direct LDL reflex    Use of tamoxifen (Nolvadex)  -     DXA bone density spine hip and pelvis; Future        Assessment & Plan  1. Hypothyroidism due to Hashimoto's thyroiditis.  Most recent thyroid function studies are normal. She is biochemically and clinically euthyroid. She will continue the same Synthroid 100 mcg daily. Biotin  should be discontinued a few days before any lab tests.    2. Osteopenia.  She will continue the same calcium and vitamin D replacement. She will continue weightbearing exercises. She is due for a DEXA scan at the end of August 2024, which has been ordered.    3. Hyperlipidemia.  Her lipid profile is slightly higher with her cholesterol levels since last visit, but not significantly. She will continue the same pravastatin 40 mg daily.    Follow-up  She will follow up in 1 year with preceding TSH, free T4, lipid panel, CMP, and CBC.        CC: Hypothyroid, osteopenia, lipid follow-up    History of Present Illness    HPI: Sharifa Ralph is a 74-year-old female with a history of hypothyroidism due to Hashimoto's thyroiditis, osteopenia, hyperlipidemia, and a family history of diabetes. She is here for a follow-up visit.    She was diagnosed between 9-14 years ago with hypothyroidism due to Hashimoto's thyroiditis and has been on thyroid hormone for replacement purposes, brand-name Synthroid ever since.   She is currently on Synthroid 100 mcg daily for her thyroid condition. She reports experiencing tremors and constipation, for which she takes a stool softener. Her sleep pattern is irregular, often waking up after an hour or two of sleep, but she does not feel excessively tired during the day. She has been using cream for dry skin and biotin for hair loss, both of which have been effective.    She is a history of osteopenia. Despite discontinuing tamoxifen last year, she continues to experience hot flashes, similar in intensity to those she had while on the medication.    She is scheduled for a DEXA scan at the end of the month. She reports no falls or fractures but does experience occasional back pain, which she manages with ice and heat. She has been under the care of a chiropractor for several years.    She is on pravastatin 40 mg daily for hyperlipidemia. She reports no chest pain or shortness of breath.    She  has a family history of type 2 diabetes.        Historical Information   Past Medical History:   Diagnosis Date    Breast cancer (HCC)     left, had lumpectomy and XRT,stage 0    H/O bone density study 06/15/2020    Osteopenia    H/O mammogram 08/05/2021    BIRADS 2B    Hemorrhoids     Hyperlipidemia     Hypothyroidism     Osteopenia     in the past    Osteoporosis screening 06/15/2020    Managed by endocrine, off Fosamax 2014, dexa due 2021     Past Surgical History:   Procedure Laterality Date    BREAST LUMPECTOMY Left     CATARACT EXTRACTION Bilateral 2023    left july and right august 2023    COLONOSCOPY  2015    Due 2020    HYSTERECTOMY      HYSTEROSCOPY W/ POLYPECTOMY  10/2015     Social History   Social History     Substance and Sexual Activity   Alcohol Use No     Social History     Substance and Sexual Activity   Drug Use No     Social History     Tobacco Use   Smoking Status Never   Smokeless Tobacco Never     Family History:   Family History   Problem Relation Age of Onset    Cervical cancer Mother     Diabetes type II Mother     Depression Father     No Known Problems Sister     No Known Problems Daughter     No Known Problems Maternal Grandmother     Heart attack Maternal Grandfather     No Known Problems Paternal Grandmother     No Known Problems Paternal Grandfather     Breast cancer Neg Hx     Uterine cancer Neg Hx     Ovarian cancer Neg Hx     Colon cancer Neg Hx        Meds/Allergies   Current Outpatient Medications   Medication Sig Dispense Refill    Ascorbic Acid (Vitamin C) 500 MG CAPS Take by mouth      Biotin 1 MG CAPS Take by mouth Prn 1-2 times a week when she remembers      Calcium Carbonate (CALCIUM 600 PO) Take 2 capsules by mouth daily      Cholecalciferol (VITAMIN D3) 50 MCG (2000 UT) capsule Take 4,000 Units by mouth daily       meloxicam (MOBIC) 7.5 mg tablet Take 7.5 mg by mouth as needed      multivitamin (THERAGRAN) TABS Take 1 tablet by mouth daily      pravastatin (PRAVACHOL) 40  "mg tablet TAKE ONE TABLET BY MOUTH DAILY 90 tablet 3    Synthroid 100 MCG tablet Take 1 tablet (100 mcg total) by mouth daily 90 tablet 2    triamcinolone (KENALOG) 0.1 % cream 1 Application Every 12 hours      Turmeric 500 MG CAPS Take by mouth in the morning       No current facility-administered medications for this visit.     No Known Allergies    Objective   Vitals: Blood pressure 128/70, pulse 64, height 5' 3.75\" (1.619 m), weight 76.4 kg (168 lb 6.4 oz), SpO2 97%, not currently breastfeeding.  Invasive Devices       None                   Physical Exam    No lid lag, stare, proptosis or periorbital edema in the eyes.  Thyroid is normal in size. No palpable thyroid nodules.  Lungs are clear to auscultation.  Heart has a regular rate and rhythm. No murmurs.  No tremor of the outstretched hands. No lower extremity edema. No spinous process tenderness.      The history was obtained from the review of the chart and from the patient.    Lab Results:      Blood work performed on 8/1/2024 showed a CBC that was normal with a hematocrit of 47.9.    CMP showed a glucose of 98 fasting but was otherwise normal.    Lab Results   Component Value Date    CREATININE 0.52 (L) 08/01/2024    CREATININE 0.44 (L) 06/29/2023    CREATININE 0.58 07/07/2022    BUN 10 08/01/2024    K 4.5 08/01/2024     08/01/2024    CO2 23 08/01/2024     eGFR   Date Value Ref Range Status   08/01/2024 98 >59 mL/min/1.73 Final     Total cholesterol 178, LDL cholesterol 100.    Lab Results   Component Value Date    HDL 51 08/01/2024    TRIG 158 (H) 08/01/2024       Lab Results   Component Value Date    ALT 26 08/01/2024    AST 24 08/01/2024       Lab Results   Component Value Date    TSH 0.499 08/01/2024    FREET4 1.64 08/01/2024             Future Appointments   Date Time Provider Department Center   5/29/2025  1:30 PM Olimpia Driscoll MD [facility]   8/5/2025 10:40 AM Brigette Torres MD [facility]     "

## 2024-08-13 NOTE — PATIENT INSTRUCTIONS
Continue the same synthroid as blood work is normal.     Dexa scan after aug 27.     Continue the calcium and vitamin d.     Continue to stay active.     Follow up in 1 year with blood work.

## 2024-08-23 DIAGNOSIS — E03.8 HYPOTHYROIDISM DUE TO HASHIMOTO'S THYROIDITIS: ICD-10-CM

## 2024-08-23 DIAGNOSIS — E06.3 HYPOTHYROIDISM DUE TO HASHIMOTO'S THYROIDITIS: ICD-10-CM

## 2024-08-23 RX ORDER — LEVOTHYROXINE SODIUM 100 MCG
100 TABLET ORAL DAILY
Qty: 90 TABLET | Refills: 1 | Status: SHIPPED | OUTPATIENT
Start: 2024-08-23

## 2024-09-23 DIAGNOSIS — E06.3 HYPOTHYROIDISM DUE TO HASHIMOTO'S THYROIDITIS: ICD-10-CM

## 2024-09-23 DIAGNOSIS — E03.8 HYPOTHYROIDISM DUE TO HASHIMOTO'S THYROIDITIS: ICD-10-CM

## 2024-09-23 RX ORDER — LEVOTHYROXINE SODIUM 100 MCG
100 TABLET ORAL DAILY
Qty: 90 TABLET | Refills: 1 | Status: SHIPPED | OUTPATIENT
Start: 2024-09-23

## 2024-10-24 DIAGNOSIS — Z79.810 USE OF TAMOXIFEN (NOLVADEX): ICD-10-CM

## 2024-10-24 DIAGNOSIS — M85.89 OSTEOPENIA OF MULTIPLE SITES: ICD-10-CM

## 2024-11-21 ENCOUNTER — RESULTS FOLLOW-UP (OUTPATIENT)
Dept: ENDOCRINOLOGY | Facility: HOSPITAL | Age: 74
End: 2024-11-21

## 2024-11-22 NOTE — TELEPHONE ENCOUNTER
Patient calling back, relayed the above message and she expressed understanding.  No further action needed

## 2025-01-13 DIAGNOSIS — E78.2 MIXED HYPERLIPIDEMIA: ICD-10-CM

## 2025-01-14 RX ORDER — PRAVASTATIN SODIUM 40 MG
40 TABLET ORAL DAILY
Qty: 90 TABLET | Refills: 1 | Status: SHIPPED | OUTPATIENT
Start: 2025-01-14

## 2025-03-11 DIAGNOSIS — E06.3 HYPOTHYROIDISM DUE TO HASHIMOTO'S THYROIDITIS: ICD-10-CM

## 2025-03-11 RX ORDER — LEVOTHYROXINE SODIUM 100 MCG
100 TABLET ORAL DAILY
Qty: 90 TABLET | Refills: 0 | Status: SHIPPED | OUTPATIENT
Start: 2025-03-11

## 2025-05-25 DIAGNOSIS — E06.3 HYPOTHYROIDISM DUE TO HASHIMOTO'S THYROIDITIS: ICD-10-CM

## 2025-05-27 RX ORDER — LEVOTHYROXINE SODIUM 100 MCG
100 TABLET ORAL DAILY
Qty: 90 TABLET | Refills: 1 | Status: SHIPPED | OUTPATIENT
Start: 2025-05-27

## 2025-05-27 NOTE — PROGRESS NOTES
"Name: Sharifa Ralph      : 1950      MRN: [id number]  Encounter Provider: Olimpia Driscoll MD  Encounter Date: 2025   Encounter department: [facility] [place]  :  Assessment & Plan  Personal history of breast cancer - left   Here for annual breast exam secondary to personal h/o breast cancer.   No change in self breast or clinical breast exams.  Mammo order given, last 24 BIRADS 2B  MIKE 12 years from diagnosis and therapy.   RTO one year       Encounter for screening mammogram for malignant neoplasm of breast    Orders:    Mammo screening bilateral w 3d and cad; Future        History of Present Illness   HPI  Sharifa Ralph is a 75 y.o. female who presents for annual exam secondary to personal h/o breast cancer    Review of Systems  No breast masses, nipple discharge or nipple bleeding.  No abdominal pain, bladder or bowel concerns.    Past Medical History   Past Medical History[1]  Past Surgical History[2]  Family History[3]   reports that she has never smoked. She has never used smokeless tobacco. She reports that she does not drink alcohol and does not use drugs.  Current Outpatient Medications   Medication Instructions    Ascorbic Acid (Vitamin C) 500 MG CAPS Take by mouth    Biotin 1 MG CAPS Take by mouth Prn 1-2 times a week when she remembers    Calcium Carbonate (CALCIUM 600 PO) 2 capsules, Daily    meloxicam (MOBIC) 7.5 mg, As needed    multivitamin (THERAGRAN) TABS 1 tablet, Daily    pravastatin (PRAVACHOL) 40 mg, Oral, Daily    Synthroid 100 mcg, Oral, Daily    triamcinolone (KENALOG) 0.1 % cream 1 Application, Every 12 hours    Turmeric 500 MG CAPS Daily    Vitamin D3 4,000 Units, Daily   Allergies[4]      Objective   /70 (BP Location: Right arm, Patient Position: Sitting, Cuff Size: Standard)   Ht 5' 3.75\" (1.619 m)   Wt 75.8 kg (167 lb)   BMI 28.89 kg/m²      Physical Exam  Appears well, no apparent distress.   Does not appear anxious or " depressed.  Neck: thyroid normal size without nodules, no palpable adenopathy  Breasts: no masses, nodes, skin changes, s/p left lumpectomy and XRT changes  Abdomen: soft, non tender, non tender liver edge       [1]   Past Medical History:  Diagnosis Date    Breast cancer (HCC)     left, had lumpectomy and XRT,stage 0    Hemorrhoids     Hyperlipidemia     Hypothyroidism     Osteopenia     in the past    Osteoporosis screening 06/15/2020    Managed by endocrine, off Fosamax 2014, dexa due 2021   [2]   Past Surgical History:  Procedure Laterality Date    BREAST LUMPECTOMY Left     CATARACT EXTRACTION Bilateral 2023    left july and right august 2023    COLONOSCOPY  2015 Due 2020    HYSTERECTOMY      HYSTEROSCOPY W/ POLYPECTOMY  10/2015   [3]   Family History  Problem Relation Name Age of Onset    Cervical cancer Mother      Diabetes type II Mother      Depression Father      No Known Problems Sister Vernell     No Known Problems Daughter Delaney     No Known Problems Maternal Grandmother      Heart attack Maternal Grandfather      No Known Problems Paternal Grandmother      No Known Problems Paternal Grandfather      Breast cancer Neg Hx      Uterine cancer Neg Hx      Ovarian cancer Neg Hx      Colon cancer Neg Hx     [4] No Known Allergies

## 2025-05-29 ENCOUNTER — OFFICE VISIT (OUTPATIENT)
Dept: OBGYN CLINIC | Facility: CLINIC | Age: 75
End: 2025-05-29

## 2025-05-29 VITALS
BODY MASS INDEX: 28.51 KG/M2 | SYSTOLIC BLOOD PRESSURE: 126 MMHG | WEIGHT: 167 LBS | DIASTOLIC BLOOD PRESSURE: 70 MMHG | HEIGHT: 64 IN

## 2025-05-29 DIAGNOSIS — Z12.31 ENCOUNTER FOR SCREENING MAMMOGRAM FOR MALIGNANT NEOPLASM OF BREAST: ICD-10-CM

## 2025-05-29 DIAGNOSIS — Z85.3 PERSONAL HISTORY OF BREAST CANCER: Primary | ICD-10-CM

## 2025-05-29 NOTE — PATIENT INSTRUCTIONS
Return to office in one year unless having any problems such as breast changes, bleeding, new persistent pain, new progressive bloating, new problems eating (getting full to quickly) or new constant urinary pressure that does not resolve in one week.    We recommend [facility]: [phone]  for your colonoscopy.

## 2025-05-29 NOTE — LETTER
May 29, 2025     Ricarda Ocampo DO  [address]  [facility] [address]    Patient: Sharifa Ralph   YOB: 1950   Date of Visit: 2025       Dear Dr. Ricarda Ocampo DO:    Sharifa Ralph was in to see me today for evaluation. Below are my notes for this visit.    If you have questions, please do not hesitate to call me. I look forward to following your patient along with you.         Sincerely,        Olimpia Driscoll MD        CC: No Recipients    Olimpia Driscoll MD  2025  4:17 PM  Sign when Signing Visit  Name: Sharifa Ralph      : 1950      MRN: [id number]  Encounter Provider: Olimpia Driscoll MD  Encounter Date: 2025   Encounter department: [facility] [place]  :  Assessment & Plan  Personal history of breast cancer - left   Here for annual breast exam secondary to personal h/o breast cancer.   No change in self breast or clinical breast exams.  Mammo order given, last 24 BIRADS 2B  MIKE 12 years from diagnosis and therapy.   RTO one year       Encounter for screening mammogram for malignant neoplasm of breast    Orders:  •  Mammo screening bilateral w 3d and cad; Future        History of Present Illness  HPI  Sharifa Ralph is a 75 y.o. female who presents for annual exam secondary to personal h/o breast cancer    Review of Systems  No breast masses, nipple discharge or nipple bleeding.  No abdominal pain, bladder or bowel concerns.    Past Medical History  Past Medical History[1]  Past Surgical History[2]  Family History[3]   reports that she has never smoked. She has never used smokeless tobacco. She reports that she does not drink alcohol and does not use drugs.  Current Outpatient Medications   Medication Instructions   • Ascorbic Acid (Vitamin C) 500 MG CAPS Take by mouth   • Biotin 1 MG CAPS Take by mouth Prn 1-2 times a week when she remembers   • Calcium Carbonate (CALCIUM 600 PO) 2 capsules, Daily   • meloxicam (MOBIC) 7.5 mg, As  "needed   • multivitamin (THERAGRAN) TABS 1 tablet, Daily   • pravastatin (PRAVACHOL) 40 mg, Oral, Daily   • Synthroid 100 mcg, Oral, Daily   • triamcinolone (KENALOG) 0.1 % cream 1 Application, Every 12 hours   • Turmeric 500 MG CAPS Daily   • Vitamin D3 4,000 Units, Daily   Allergies[4]      Objective  /70 (BP Location: Right arm, Patient Position: Sitting, Cuff Size: Standard)   Ht 5' 3.75\" (1.619 m)   Wt 75.8 kg (167 lb)   BMI 28.89 kg/m²      Physical Exam  Appears well, no apparent distress.   Does not appear anxious or depressed.  Neck: thyroid normal size without nodules, no palpable adenopathy  Breasts: no masses, nodes, skin changes, s/p left lumpectomy and XRT changes  Abdomen: soft, non tender, non tender liver edge       [1]   Past Medical History:  Diagnosis Date   • Breast cancer (HCC)     left, had lumpectomy and XRT,stage 0   • Hemorrhoids    • Hyperlipidemia    • Hypothyroidism    • Osteopenia     in the past   • Osteoporosis screening 06/15/2020    Managed by endocrine, off Fosamax 2014, dexa due 2021   [2]   Past Surgical History:  Procedure Laterality Date   • BREAST LUMPECTOMY Left    • CATARACT EXTRACTION Bilateral 2023    left july and right august 2023   • COLONOSCOPY  2015    Due 2020   • HYSTERECTOMY     • HYSTEROSCOPY W/ POLYPECTOMY  10/2015   [3]   Family History  Problem Relation Name Age of Onset   • Cervical cancer Mother     • Diabetes type II Mother     • Depression Father     • No Known Problems Sister Vernell    • No Known Problems Daughter Delaney    • No Known Problems Maternal Grandmother     • Heart attack Maternal Grandfather     • No Known Problems Paternal Grandmother     • No Known Problems Paternal Grandfather     • Breast cancer Neg Hx     • Uterine cancer Neg Hx     • Ovarian cancer Neg Hx     • Colon cancer Neg Hx     [4] No Known Allergies    "

## 2025-05-29 NOTE — ASSESSMENT & PLAN NOTE
Here for annual breast exam secondary to personal h/o breast cancer.   No change in self breast or clinical breast exams.  Mammo order given, last 5/23/24 BIRADS 2B  MIKE 12 years from diagnosis and therapy.   RTO one year

## 2025-07-21 DIAGNOSIS — E78.2 MIXED HYPERLIPIDEMIA: ICD-10-CM

## 2025-07-22 RX ORDER — PRAVASTATIN SODIUM 40 MG
40 TABLET ORAL DAILY
Qty: 90 TABLET | Refills: 0 | Status: SHIPPED | OUTPATIENT
Start: 2025-07-22

## 2025-08-12 ENCOUNTER — OFFICE VISIT (OUTPATIENT)
Dept: ENDOCRINOLOGY | Facility: HOSPITAL | Age: 75
End: 2025-08-12
Payer: COMMERCIAL